# Patient Record
Sex: FEMALE | Race: BLACK OR AFRICAN AMERICAN | NOT HISPANIC OR LATINO | Employment: OTHER | URBAN - METROPOLITAN AREA
[De-identification: names, ages, dates, MRNs, and addresses within clinical notes are randomized per-mention and may not be internally consistent; named-entity substitution may affect disease eponyms.]

---

## 2017-02-06 ENCOUNTER — HISTORICAL (OUTPATIENT)
Dept: ADMINISTRATIVE | Facility: HOSPITAL | Age: 67
End: 2017-02-06

## 2017-05-19 ENCOUNTER — HISTORICAL (OUTPATIENT)
Dept: LAB | Facility: HOSPITAL | Age: 67
End: 2017-05-19

## 2017-11-13 LAB
INFLUENZA A ANTIGEN, POC: NEGATIVE
INFLUENZA B ANTIGEN, POC: NEGATIVE

## 2017-12-14 ENCOUNTER — HISTORICAL (OUTPATIENT)
Dept: LAB | Facility: HOSPITAL | Age: 67
End: 2017-12-14

## 2017-12-14 LAB
ALBUMIN SERPL-MCNC: 4 GM/DL (ref 3.4–5)
ALP SERPL-CCNC: 68 UNIT/L (ref 38–126)
ALT SERPL-CCNC: 39 UNIT/L (ref 12–78)
AST SERPL-CCNC: 24 UNIT/L (ref 15–37)
BILIRUB SERPL-MCNC: 0.4 MG/DL (ref 0.2–1)
BILIRUBIN DIRECT+TOT PNL SERPL-MCNC: 0.1 MG/DL (ref 0–0.2)
BILIRUBIN DIRECT+TOT PNL SERPL-MCNC: 0.3 MG/DL (ref 0–0.8)
CHOLEST SERPL-MCNC: 203 MG/DL (ref 0–200)
CHOLEST/HDLC SERPL: 4.5 {RATIO} (ref 0–4)
EST. AVERAGE GLUCOSE BLD GHB EST-MCNC: 131 MG/DL
HBA1C MFR BLD: 6.2 % (ref 4.2–6.3)
HDLC SERPL-MCNC: 45 MG/DL (ref 35–60)
LDLC SERPL CALC-MCNC: 114 MG/DL (ref 0–129)
LIVER PROFILE INTERP: NORMAL
PROT SERPL-MCNC: 7.5 GM/DL (ref 6.4–8.2)
TRIGL SERPL-MCNC: 219 MG/DL (ref 30–150)
VLDLC SERPL CALC-MCNC: 44 MG/DL

## 2018-02-27 ENCOUNTER — HISTORICAL (OUTPATIENT)
Dept: ADMINISTRATIVE | Facility: HOSPITAL | Age: 68
End: 2018-02-27

## 2018-05-23 ENCOUNTER — HISTORICAL (OUTPATIENT)
Dept: LAB | Facility: HOSPITAL | Age: 68
End: 2018-05-23

## 2018-05-28 ENCOUNTER — HOSPITAL ENCOUNTER (EMERGENCY)
Facility: HOSPITAL | Age: 68
Discharge: HOME | End: 2018-05-28
Attending: EMERGENCY MEDICINE
Payer: MEDICARE

## 2018-05-28 VITALS
SYSTOLIC BLOOD PRESSURE: 150 MMHG | DIASTOLIC BLOOD PRESSURE: 79 MMHG | WEIGHT: 201 LBS | HEIGHT: 62 IN | OXYGEN SATURATION: 98 % | TEMPERATURE: 98 F | HEART RATE: 105 BPM | RESPIRATION RATE: 19 BRPM | BODY MASS INDEX: 36.99 KG/M2

## 2018-05-28 DIAGNOSIS — J06.9 ACUTE URI: ICD-10-CM

## 2018-05-28 DIAGNOSIS — J20.9 ACUTE BRONCHITIS, UNSPECIFIED ORGANISM: Primary | ICD-10-CM

## 2018-05-28 DIAGNOSIS — R05.9 COUGH: ICD-10-CM

## 2018-05-28 PROCEDURE — 99284 EMERGENCY DEPT VISIT MOD MDM: CPT

## 2018-05-28 PROCEDURE — 99900035 HC TECH TIME PER 15 MIN (STAT)

## 2018-05-28 PROCEDURE — 93005 ELECTROCARDIOGRAM TRACING: CPT

## 2018-05-28 RX ORDER — ALBUTEROL SULFATE 90 UG/1
1-2 AEROSOL, METERED RESPIRATORY (INHALATION) EVERY 6 HOURS PRN
Qty: 1 INHALER | Refills: 0 | Status: SHIPPED | OUTPATIENT
Start: 2018-05-28 | End: 2023-08-30

## 2018-05-28 RX ORDER — PREDNISONE 10 MG/1
10 TABLET ORAL DAILY
Qty: 21 TABLET | Refills: 0 | Status: SHIPPED | OUTPATIENT
Start: 2018-05-28 | End: 2018-06-07

## 2018-05-28 RX ORDER — LEVOCETIRIZINE DIHYDROCHLORIDE 5 MG/1
5 TABLET, FILM COATED ORAL NIGHTLY
Qty: 20 TABLET | Refills: 0 | Status: SHIPPED | OUTPATIENT
Start: 2018-05-28 | End: 2023-07-19

## 2018-05-28 RX ORDER — LOSARTAN POTASSIUM AND HYDROCHLOROTHIAZIDE 12.5; 5 MG/1; MG/1
1 TABLET ORAL DAILY
COMMUNITY
End: 2023-01-09

## 2018-05-28 RX ORDER — BENZONATATE 100 MG/1
100 CAPSULE ORAL 3 TIMES DAILY PRN
Qty: 20 CAPSULE | Refills: 0 | Status: SHIPPED | OUTPATIENT
Start: 2018-05-28 | End: 2018-06-07

## 2018-05-28 RX ORDER — MELOXICAM 15 MG/1
15 TABLET ORAL DAILY
COMMUNITY
End: 2022-05-26

## 2018-05-28 NOTE — ED PROVIDER NOTES
Encounter Date: 5/28/2018       History     Chief Complaint   Patient presents with    URI     cough , congestion and sinus pressure since thurs     The history is provided by the patient.   Cough   This is a new problem. Illness onset: 3-4 days ago. The problem has been waxing and waning. The cough is non-productive. There has been no fever. Pertinent negatives include no chest pain, no chills, no sweats, no ear congestion, no headaches, no rhinorrhea, no sore throat, no myalgias, no shortness of breath, no wheezing and no eye redness. Treatments tried: mucinex DM. The treatment provided mild relief. She is not a smoker. Her past medical history does not include bronchitis, pneumonia, bronchiectasis, COPD, emphysema or asthma.     Review of patient's allergies indicates:   Allergen Reactions    Codeine Nausea Only     Past Medical History:   Diagnosis Date    Arthritis     Hypertension      Past Surgical History:   Procedure Laterality Date    COLON SURGERY       Family History   Problem Relation Age of Onset    Diabetes Mother     Cancer Father      Social History   Substance Use Topics    Smoking status: Never Smoker    Smokeless tobacco: Never Used    Alcohol use No     Review of Systems   Constitutional: Negative for chills and fever.   HENT: Positive for congestion and postnasal drip. Negative for rhinorrhea and sore throat.    Eyes: Negative for redness.   Respiratory: Positive for cough. Negative for shortness of breath and wheezing.    Cardiovascular: Negative for chest pain and leg swelling.   Gastrointestinal: Negative for nausea and vomiting.   Genitourinary: Negative for dysuria.   Musculoskeletal: Negative for back pain and myalgias.   Skin: Negative for rash.   Neurological: Negative for weakness and headaches.   Hematological: Does not bruise/bleed easily.   All other systems reviewed and are negative.      Physical Exam     Initial Vitals [05/28/18 0824]   BP Pulse Resp Temp SpO2   (!)  170/82 100 20 98.2 °F (36.8 °C) 99 %      MAP       111.33         Physical Exam    Nursing note and vitals reviewed.  Constitutional: She appears well-developed and well-nourished. No distress.   HENT:   Head: Normocephalic and atraumatic.   Eyes: EOM are normal. Pupils are equal, round, and reactive to light.   Neck: Normal range of motion. Neck supple.   Cardiovascular: Regular rhythm, normal heart sounds and intact distal pulses. Tachycardia present.    pulse @ 100 on exam reg. c occ. Ectopic beat   Pulmonary/Chest: Breath sounds normal. No stridor. She has no wheezes. She has no rhonchi.   Abdominal: Soft. Bowel sounds are normal. She exhibits no mass. There is no rebound and no guarding.   Musculoskeletal: Normal range of motion. She exhibits no edema.   Neurological: She is alert and oriented to person, place, and time. She has normal strength. No sensory deficit.   Skin: Skin is warm and dry. Capillary refill takes less than 2 seconds. No rash noted.   Psychiatric: She has a normal mood and affect. Her behavior is normal. Judgment and thought content normal.         ED Course   Procedures  Labs Reviewed - No data to display  EKG Readings: (Independently Interpreted)   Initial Reading: No STEMI. Rhythm: Sinus Tachycardia. Heart Rate: 108. Ectopy: PACs Less than or equal to 6/min. Other Impression: Sinus tachycardia at 108 with occasional premature supraventricular complexes.  Nonspecific ST T wave abnormalities.  No STEMI              Imaging Results          X-Ray Chest PA And Lateral (Final result)  Result time 05/28/18 09:20:21    Final result by Tomas Bush MD (05/28/18 09:20:21)                 Impression:      No acute process      Electronically signed by: Tomas Bush MD  Date:    05/28/2018  Time:    09:20             Narrative:    EXAMINATION:  XR CHEST PA AND LATERAL    CLINICAL HISTORY:  Cough    FINDINGS:  No prior study.  Normal size heart.  No congestion.  Lungs are clear.  Thoracic  spondylosis.                                          patient does state that she has had it occasional palpitations recently.  She will follow up with her primary care doctor and discuss possible outpatient Holter monitor.  She has not complained of any chest pain is very comfortable with symptomatic outpatient treatment at this time.  She will return emergency department for any worsening signs or symptoms.        Clinical Impression:   The primary encounter diagnosis was Acute bronchitis, unspecified organism. Diagnoses of Cough and Acute URI were also pertinent to this visit.    Disposition:   Disposition: Discharged  Condition: Stable                        Jose Elias Finley MD  05/28/18 0931

## 2018-05-28 NOTE — ED NOTES
aaox3 , skin warm and dry, resp unlabored and even, cough and congestion since thurs. ,amb with steady gait and portillo well.

## 2018-05-30 ENCOUNTER — HISTORICAL (OUTPATIENT)
Dept: LAB | Facility: HOSPITAL | Age: 68
End: 2018-05-30

## 2018-06-06 ENCOUNTER — HISTORICAL (OUTPATIENT)
Dept: LAB | Facility: HOSPITAL | Age: 68
End: 2018-06-06

## 2018-10-16 ENCOUNTER — HOSPITAL ENCOUNTER (EMERGENCY)
Facility: HOSPITAL | Age: 68
Discharge: HOME | End: 2018-10-16
Attending: EMERGENCY MEDICINE
Payer: MEDICARE

## 2018-10-16 VITALS
RESPIRATION RATE: 22 BRPM | DIASTOLIC BLOOD PRESSURE: 77 MMHG | TEMPERATURE: 99 F | SYSTOLIC BLOOD PRESSURE: 149 MMHG | OXYGEN SATURATION: 97 % | WEIGHT: 195.56 LBS | HEART RATE: 99 BPM | BODY MASS INDEX: 35.77 KG/M2

## 2018-10-16 DIAGNOSIS — R10.11 RIGHT UPPER QUADRANT ABDOMINAL PAIN: Primary | ICD-10-CM

## 2018-10-16 DIAGNOSIS — K59.00 CONSTIPATION, UNSPECIFIED CONSTIPATION TYPE: ICD-10-CM

## 2018-10-16 DIAGNOSIS — I10 ESSENTIAL HYPERTENSION: ICD-10-CM

## 2018-10-16 LAB
ALBUMIN SERPL BCP-MCNC: 4 G/DL
ALP SERPL-CCNC: 58 U/L
ALT SERPL W/O P-5'-P-CCNC: 26 U/L
ANION GAP SERPL CALC-SCNC: 10 MMOL/L
AST SERPL-CCNC: 24 U/L
BACTERIA #/AREA URNS AUTO: ABNORMAL /HPF
BASOPHILS # BLD AUTO: 0.03 K/UL
BASOPHILS NFR BLD: 0.2 %
BILIRUB SERPL-MCNC: 0.9 MG/DL
BILIRUB UR QL STRIP: NEGATIVE
BUN SERPL-MCNC: 13 MG/DL
CALCIUM SERPL-MCNC: 9.7 MG/DL
CHLORIDE SERPL-SCNC: 104 MMOL/L
CLARITY UR REFRACT.AUTO: ABNORMAL
CO2 SERPL-SCNC: 27 MMOL/L
COLOR UR AUTO: YELLOW
CREAT SERPL-MCNC: 0.8 MG/DL
DIFFERENTIAL METHOD: ABNORMAL
EOSINOPHIL # BLD AUTO: 0.1 K/UL
EOSINOPHIL NFR BLD: 0.4 %
ERYTHROCYTE [DISTWIDTH] IN BLOOD BY AUTOMATED COUNT: 14.8 %
EST. GFR  (AFRICAN AMERICAN): >60 ML/MIN/1.73 M^2
EST. GFR  (NON AFRICAN AMERICAN): >60 ML/MIN/1.73 M^2
GLUCOSE SERPL-MCNC: 103 MG/DL
GLUCOSE UR QL STRIP: NEGATIVE
HCT VFR BLD AUTO: 33.2 %
HGB BLD-MCNC: 11.8 G/DL
HGB UR QL STRIP: ABNORMAL
KETONES UR QL STRIP: NEGATIVE
LEUKOCYTE ESTERASE UR QL STRIP: ABNORMAL
LIPASE SERPL-CCNC: 12 U/L
LYMPHOCYTES # BLD AUTO: 2.3 K/UL
LYMPHOCYTES NFR BLD: 14.3 %
MCH RBC QN AUTO: 28 PG
MCHC RBC AUTO-ENTMCNC: 35.5 G/DL
MCV RBC AUTO: 79 FL
MICROSCOPIC COMMENT: ABNORMAL
MONOCYTES # BLD AUTO: 1.7 K/UL
MONOCYTES NFR BLD: 10.6 %
NEUTROPHILS # BLD AUTO: 11.7 K/UL
NEUTROPHILS NFR BLD: 74.2 %
NITRITE UR QL STRIP: POSITIVE
PH UR STRIP: 8 [PH] (ref 5–8)
PLATELET # BLD AUTO: 332 K/UL
PMV BLD AUTO: 10.9 FL
POTASSIUM SERPL-SCNC: 3.9 MMOL/L
PROT SERPL-MCNC: 7.9 G/DL
PROT UR QL STRIP: NEGATIVE
RBC # BLD AUTO: 4.21 M/UL
RBC #/AREA URNS AUTO: 3 /HPF (ref 0–4)
SODIUM SERPL-SCNC: 141 MMOL/L
SP GR UR STRIP: 1.01 (ref 1–1.03)
SQUAMOUS #/AREA URNS AUTO: 2 /HPF
TROPONIN I SERPL DL<=0.01 NG/ML-MCNC: <0.006 NG/ML
URN SPEC COLLECT METH UR: ABNORMAL
UROBILINOGEN UR STRIP-ACNC: NEGATIVE EU/DL
WBC # BLD AUTO: 15.8 K/UL
WBC #/AREA URNS AUTO: 6 /HPF (ref 0–5)

## 2018-10-16 PROCEDURE — 84484 ASSAY OF TROPONIN QUANT: CPT

## 2018-10-16 PROCEDURE — 93005 ELECTROCARDIOGRAM TRACING: CPT

## 2018-10-16 PROCEDURE — 81000 URINALYSIS NONAUTO W/SCOPE: CPT

## 2018-10-16 PROCEDURE — 99285 EMERGENCY DEPT VISIT HI MDM: CPT | Mod: 25

## 2018-10-16 PROCEDURE — 99900035 HC TECH TIME PER 15 MIN (STAT)

## 2018-10-16 PROCEDURE — 93010 ELECTROCARDIOGRAM REPORT: CPT | Mod: ,,, | Performed by: INTERNAL MEDICINE

## 2018-10-16 PROCEDURE — 85025 COMPLETE CBC W/AUTO DIFF WBC: CPT

## 2018-10-16 PROCEDURE — 80053 COMPREHEN METABOLIC PANEL: CPT

## 2018-10-16 PROCEDURE — 83690 ASSAY OF LIPASE: CPT

## 2018-10-16 RX ORDER — POLYETHYLENE GLYCOL 3350 17 G/17G
17 POWDER, FOR SOLUTION ORAL DAILY
Qty: 24 PACKET | Refills: 0 | Status: SHIPPED | OUTPATIENT
Start: 2018-10-16 | End: 2023-08-30

## 2018-10-16 RX ORDER — SYRING-NEEDL,DISP,INSUL,0.3 ML 29 G X1/2"
296 SYRINGE, EMPTY DISPOSABLE MISCELLANEOUS ONCE
Qty: 300 ML | Refills: 0 | Status: SHIPPED | OUTPATIENT
Start: 2018-10-16 | End: 2018-10-16

## 2018-10-16 NOTE — ED PROVIDER NOTES
Encounter Date: 10/16/2018       History     Chief Complaint   Patient presents with    Abdominal Pain     RUQ pain onset Sunday, took Mag Citrate without relief, unsure if constipation     The history is provided by the patient.   Abdominal Pain   The current episode started two days ago. The onset of the illness was gradual. The problem has been gradually worsening. The abdominal pain is located in the RUQ. The abdominal pain does not radiate. The severity of the abdominal pain is 8/10. Relieved by: warm towel. The abdominal pain is exacerbated by certain positions. The other symptoms of the illness include diarrhea. The other symptoms of the illness do not include fever, jaundice, melena, shortness of breath, nausea, vomiting, dysuria, hematemesis or hematochezia.   The diarrhea began yesterday. The diarrhea is watery.   The illness is associated with laxative use (took mag citrate b/c she thought she might be constipated.). The patient states that she believes she is currently not pregnant. The patient has had a change in bowel habit. Additional symptoms associated with the illness include constipation. Symptoms associated with the illness do not include chills, anorexia, diaphoresis, heartburn, urgency, hematuria, frequency or back pain. Significant associated medical issues include diverticulitis. Significant associated medical issues do not include PUD, inflammatory bowel disease, diabetes, gallstones, liver disease or substance abuse.     Pt visiting from [facility] PCP here so she came to ED for eval.      Review of patient's allergies indicates:   Allergen Reactions    Codeine Nausea Only     Past Medical History:   Diagnosis Date    Arthritis     Hypertension      Past Surgical History:   Procedure Laterality Date    COLON SURGERY       Family History   Problem Relation Age of Onset    Diabetes Mother     Cancer Father      Social History     Tobacco Use    Smoking status: Never Smoker     Smokeless tobacco: Never Used   Substance Use Topics    Alcohol use: No    Drug use: No     Review of Systems   Constitutional: Negative for chills, diaphoresis and fever.   HENT: Negative for sore throat.    Respiratory: Negative for shortness of breath.    Cardiovascular: Negative for chest pain.   Gastrointestinal: Positive for abdominal pain, constipation and diarrhea. Negative for anorexia, blood in stool, heartburn, hematemesis, hematochezia, jaundice, melena, nausea and vomiting.   Genitourinary: Negative for dysuria, frequency, hematuria and urgency.   Musculoskeletal: Negative for back pain.   Skin: Negative for rash.   Neurological: Negative for weakness.   Hematological: Does not bruise/bleed easily.   All other systems reviewed and are negative.      Physical Exam     Initial Vitals [10/16/18 0836]   BP Pulse Resp Temp SpO2   (!) 171/81 108 16 98.9 °F (37.2 °C) 98 %      MAP       --         Vitals:    10/16/18 0836 10/16/18 0900 10/16/18 0926 10/16/18 1002   BP: (!) 171/81  (!) 143/76 (!) 149/77   Pulse: 108 106 102 99   Resp: 16  (!) 22    Temp: 98.9 °F (37.2 °C)      TempSrc: Oral      SpO2: 98%  97% 97%   Weight: 88.7 kg (195 lb 8.8 oz)        Physical Exam    Nursing note and vitals reviewed.  Constitutional: She appears well-developed and well-nourished. No distress.   HENT:   Head: Normocephalic and atraumatic.   Eyes: EOM are normal. Pupils are equal, round, and reactive to light.   Neck: Normal range of motion. Neck supple.   Cardiovascular: Normal rate, regular rhythm, normal heart sounds and intact distal pulses.   Pulmonary/Chest: Breath sounds normal. No stridor. She has no wheezes. She has no rhonchi.   Abdominal: Soft. Bowel sounds are normal. She exhibits no distension, no abdominal bruit, no ascites and no mass. There is tenderness in the right upper quadrant. There is positive Ray's sign. There is no rebound, no guarding and no tenderness at McBurney's point.   Musculoskeletal:  Normal range of motion. She exhibits no edema.   Neurological: She is alert and oriented to person, place, and time. She has normal strength. No sensory deficit.   Skin: Skin is warm and dry. Capillary refill takes less than 2 seconds. No rash noted.   Psychiatric: She has a normal mood and affect. Her behavior is normal. Judgment and thought content normal.         ED Course   Procedures  Labs Reviewed   CBC W/ AUTO DIFFERENTIAL - Abnormal; Notable for the following components:       Result Value    WBC 15.80 (*)     Hemoglobin 11.8 (*)     Hematocrit 33.2 (*)     MCV 79 (*)     RDW 14.8 (*)     Gran # (ANC) 11.7 (*)     Mono # 1.7 (*)     Gran% 74.2 (*)     Lymph% 14.3 (*)     All other components within normal limits   URINALYSIS, REFLEX TO URINE CULTURE - Abnormal; Notable for the following components:    Appearance, UA Hazy (*)     Occult Blood UA 1+ (*)     Nitrite, UA Positive (*)     Leukocytes, UA Trace (*)     All other components within normal limits    Narrative:     Preferred Collection Type->Urine, Clean Catch   URINALYSIS MICROSCOPIC - Abnormal; Notable for the following components:    WBC, UA 6 (*)     Bacteria, UA Moderate (*)     All other components within normal limits    Narrative:     Preferred Collection Type->Urine, Clean Catch   COMPREHENSIVE METABOLIC PANEL   LIPASE   TROPONIN I     Results for orders placed or performed during the hospital encounter of 10/16/18   CBC W/ AUTO DIFFERENTIAL   Result Value Ref Range    WBC 15.80 (H) 3.90 - 12.70 K/uL    RBC 4.21 4.00 - 5.40 M/uL    Hemoglobin 11.8 (L) 12.0 - 16.0 g/dL    Hematocrit 33.2 (L) 37.0 - 48.5 %    MCV 79 (L) 82 - 98 fL    MCH 28.0 27.0 - 31.0 pg    MCHC 35.5 32.0 - 36.0 g/dL    RDW 14.8 (H) 11.5 - 14.5 %    Platelets 332 150 - 350 K/uL    MPV 10.9 9.2 - 12.9 fL    Gran # (ANC) 11.7 (H) 1.8 - 7.7 K/uL    Lymph # 2.3 1.0 - 4.8 K/uL    Mono # 1.7 (H) 0.3 - 1.0 K/uL    Eos # 0.1 0.0 - 0.5 K/uL    Baso # 0.03 0.00 - 0.20 K/uL    Gran%  74.2 (H) 38.0 - 73.0 %    Lymph% 14.3 (L) 18.0 - 48.0 %    Mono% 10.6 4.0 - 15.0 %    Eosinophil% 0.4 0.0 - 8.0 %    Basophil% 0.2 0.0 - 1.9 %    Differential Method Automated    Comp. Metabolic Panel   Result Value Ref Range    Sodium 141 136 - 145 mmol/L    Potassium 3.9 3.5 - 5.1 mmol/L    Chloride 104 95 - 110 mmol/L    CO2 27 23 - 29 mmol/L    Glucose 103 70 - 110 mg/dL    BUN, Bld 13 8 - 23 mg/dL    Creatinine 0.8 0.5 - 1.4 mg/dL    Calcium 9.7 8.7 - 10.5 mg/dL    Total Protein 7.9 6.0 - 8.4 g/dL    Albumin 4.0 3.5 - 5.2 g/dL    Total Bilirubin 0.9 0.1 - 1.0 mg/dL    Alkaline Phosphatase 58 55 - 135 U/L    AST 24 10 - 40 U/L    ALT 26 10 - 44 U/L    Anion Gap 10 8 - 16 mmol/L    eGFR if African American >60.0 >60 mL/min/1.73 m^2    eGFR if non African American >60.0 >60 mL/min/1.73 m^2   Lipase   Result Value Ref Range    Lipase 12 4 - 60 U/L   Urinalysis, Reflex to Urine Culture Urine, Clean Catch   Result Value Ref Range    Specimen UA Urine, Clean Catch     Color, UA Yellow Yellow, Straw, Jessie    Appearance, UA Hazy (A) Clear    pH, UA 8.0 5.0 - 8.0    Specific Gravity, UA 1.010 1.005 - 1.030    Protein, UA Negative Negative    Glucose, UA Negative Negative    Ketones, UA Negative Negative    Bilirubin (UA) Negative Negative    Occult Blood UA 1+ (A) Negative    Nitrite, UA Positive (A) Negative    Urobilinogen, UA Negative <2.0 EU/dL    Leukocytes, UA Trace (A) Negative   Troponin I   Result Value Ref Range    Troponin I <0.006 0.000 - 0.026 ng/mL   Urinalysis Microscopic   Result Value Ref Range    RBC, UA 3 0 - 4 /hpf    WBC, UA 6 (H) 0 - 5 /hpf    Bacteria, UA Moderate (A) None-Occ /hpf    Squam Epithel, UA 2 /hpf    Microscopic Comment SEE COMMENT       EKG Readings: (Independently Interpreted)   Initial Reading: No STEMI. Rhythm: Sinus Tachycardia. Heart Rate: 110. Ectopy: No Ectopy. Other Impression: Sinus tachycardia at 110 with nonspecific ST T wave abnormalities.  No STEMI       Imaging Results           US Abdomen Limited (Final result)  Result time 10/16/18 09:37:53    Final result by MEMO Napier Sr., MD (10/16/18 09:37:53)                 Impression:      1. Hepatomegaly most likely secondary to hepatic steatosis.  The liver measures 18.6 cm in length.  2. The gallbladder is contracted.  3. There is an 8 mm cortically based cyst in the superior pole of the right kidney. There is no hydronephrosis or nephrolithiasis.      Electronically signed by: Andriy Napier MD  Date:    10/16/2018  Time:    09:37             Narrative:    EXAMINATION:  US ABDOMEN LIMITED    CLINICAL HISTORY:  RUQ pain; the patient eat a boiled egg this morning    TECHNIQUE:  Multiple static ultrasound images are submitted for interpretation.    FINDINGS:  The liver is enlarged.  It measures 18.6 cm in length.  The liver has mildly increased echogenicity and coarsening of its echotexture.  There is no intrahepatic biliary ductal dilatation. The common bile duct has a diameter of 5 mm. The gallbladder is contracted.  The visualized portion of the pancreas is normal in appearance. The right kidney measures 12.1 cm in length.  There is an 8 mm cortically based cyst in the superior pole of the right kidney.  There is no hydronephrosis or nephrolithiasis.  The main portal vein has a normal venous waveform with flow directed towards the liver. It has a velocity of 15 cm/sec.                               X-Ray Abdomen Flat And Erect (Final result)  Result time 10/16/18 09:14:20    Final result by MEMO Napier Sr., MD (10/16/18 09:14:20)                 Impression:      1. The superior aspect of the lumbar spine is slightly tilted towards the right.  This may be positional.  2. The bowel gas pattern is normal in appearance.      Electronically signed by: Andriy Napier MD  Date:    10/16/2018  Time:    09:14             Narrative:    EXAMINATION:  XR ABDOMEN FLAT AND ERECT    CLINICAL HISTORY:  Abdominal  Pain;    COMPARISON:  None    FINDINGS:  The bowel gas pattern is normal in appearance. There is no pneumoperitoneum.  The superior aspect of the lumbar spine is slightly tilted towards the right.                                  Imaging Results          US Abdomen Limited (Final result)  Result time 10/16/18 09:37:53    Final result by MEMO Napier Sr., MD (10/16/18 09:37:53)                 Impression:      1. Hepatomegaly most likely secondary to hepatic steatosis.  The liver measures 18.6 cm in length.  2. The gallbladder is contracted.  3. There is an 8 mm cortically based cyst in the superior pole of the right kidney. There is no hydronephrosis or nephrolithiasis.      Electronically signed by: Andriy Napier MD  Date:    10/16/2018  Time:    09:37             Narrative:    EXAMINATION:  US ABDOMEN LIMITED    CLINICAL HISTORY:  RUQ pain; the patient eat a boiled egg this morning    TECHNIQUE:  Multiple static ultrasound images are submitted for interpretation.    FINDINGS:  The liver is enlarged.  It measures 18.6 cm in length.  The liver has mildly increased echogenicity and coarsening of its echotexture.  There is no intrahepatic biliary ductal dilatation. The common bile duct has a diameter of 5 mm. The gallbladder is contracted.  The visualized portion of the pancreas is normal in appearance. The right kidney measures 12.1 cm in length.  There is an 8 mm cortically based cyst in the superior pole of the right kidney.  There is no hydronephrosis or nephrolithiasis.  The main portal vein has a normal venous waveform with flow directed towards the liver. It has a velocity of 15 cm/sec.                               X-Ray Abdomen Flat And Erect (Final result)  Result time 10/16/18 09:14:20    Final result by MEMO Napier Sr., MD (10/16/18 09:14:20)                 Impression:      1. The superior aspect of the lumbar spine is slightly tilted towards the right.  This may be positional.  2. The bowel  gas pattern is normal in appearance.      Electronically signed by: Andriy Napier MD  Date:    10/16/2018  Time:    09:14             Narrative:    EXAMINATION:  XR ABDOMEN FLAT AND ERECT    CLINICAL HISTORY:  Abdominal Pain;    COMPARISON:  None    FINDINGS:  The bowel gas pattern is normal in appearance. There is no pneumoperitoneum.  The superior aspect of the lumbar spine is slightly tilted towards the right.                                      patient states she thought her discomfort was due to constipation and took a partial bottle of magnesium citrate he did have some liquid stool.  I discussed her workup does not seem consistent with a severe infections/gallbladder pathology/etc at this time and she still has significant amount of stool on the x-ray in the area of her discomfort.  We discussed starting MiraLax and repeating the magnesium citrate to see if we can relieve her symptoms.  She will follow up with her primary care doctor in the next 12-24 hours for recheck if her symptoms persist and return to the emergency department immediately for any worsening signs or symptoms.    I discussed with patient and/or family/caretaker that evaluation in the ED does not suggest any emergent or life threatening medical conditions requiring immediate intervention beyond what was provided in the ED, and I believe patient is safe for discharge.  Regardless, an unremarkable evaluation in the ED does not preclude the development or presence of a serious of life threatening condition. As such, patient was instructed to return immediately for any worsening or change in current symptoms.              Clinical Impression:   The primary encounter diagnosis was Right upper quadrant abdominal pain. Diagnoses of Constipation, unspecified constipation type and Essential hypertension were also pertinent to this visit.      Disposition:   Disposition: Discharged  Condition: Stable                        Jose Elias Finley MD  10/16/18  [id number]

## 2018-11-09 ENCOUNTER — HISTORICAL (OUTPATIENT)
Dept: ADMINISTRATIVE | Facility: HOSPITAL | Age: 68
End: 2018-11-09

## 2018-11-09 LAB
ABS NEUT (OLG): 3.26 X10(3)/MCL (ref 2.1–9.2)
ALBUMIN SERPL-MCNC: 4 GM/DL (ref 3.4–5)
ALBUMIN/GLOB SERPL: 1.1 RATIO (ref 1.1–2)
ALP SERPL-CCNC: 65 UNIT/L (ref 38–126)
ALT SERPL-CCNC: 29 UNIT/L (ref 12–78)
APPEARANCE, UA: ABNORMAL
AST SERPL-CCNC: 14 UNIT/L (ref 15–37)
BACTERIA SPEC CULT: ABNORMAL /HPF
BASOPHILS # BLD AUTO: 0 X10(3)/MCL (ref 0–0.2)
BASOPHILS NFR BLD AUTO: 0 %
BILIRUB SERPL-MCNC: 0.6 MG/DL (ref 0.2–1)
BILIRUB UR QL STRIP: NEGATIVE
BILIRUBIN DIRECT+TOT PNL SERPL-MCNC: 0.1 MG/DL (ref 0–0.5)
BILIRUBIN DIRECT+TOT PNL SERPL-MCNC: 0.5 MG/DL (ref 0–0.8)
BUN SERPL-MCNC: 15 MG/DL (ref 7–18)
CALCIUM SERPL-MCNC: 9.1 MG/DL (ref 8.5–10.1)
CHLORIDE SERPL-SCNC: 106 MMOL/L (ref 98–107)
CHOLEST SERPL-MCNC: 211 MG/DL (ref 0–200)
CHOLEST/HDLC SERPL: 4.4 {RATIO} (ref 0–4)
CO2 SERPL-SCNC: 28 MMOL/L (ref 21–32)
COLOR UR: YELLOW
CREAT SERPL-MCNC: 0.8 MG/DL (ref 0.55–1.02)
DEPRECATED CALCIDIOL+CALCIFEROL SERPL-MC: 53.93 NG/ML (ref 30–80)
EOSINOPHIL # BLD AUTO: 0.2 X10(3)/MCL (ref 0–0.9)
EOSINOPHIL NFR BLD AUTO: 3 %
ERYTHROCYTE [DISTWIDTH] IN BLOOD BY AUTOMATED COUNT: 15.1 % (ref 11.5–17)
GLOBULIN SER-MCNC: 3.5 GM/DL (ref 2.4–3.5)
GLUCOSE (UA): NEGATIVE
GLUCOSE SERPL-MCNC: 91 MG/DL (ref 74–106)
HCT VFR BLD AUTO: 35.7 % (ref 37–47)
HDLC SERPL-MCNC: 48 MG/DL (ref 35–60)
HGB BLD-MCNC: 12 GM/DL (ref 12–16)
HGB UR QL STRIP: ABNORMAL
KETONES UR QL STRIP: NEGATIVE
LDLC SERPL CALC-MCNC: 126 MG/DL (ref 0–129)
LEUKOCYTE ESTERASE UR QL STRIP: ABNORMAL
LYMPHOCYTES # BLD AUTO: 2.2 X10(3)/MCL (ref 0.6–4.6)
LYMPHOCYTES NFR BLD AUTO: 34 %
MCH RBC QN AUTO: 27.3 PG (ref 27–31)
MCHC RBC AUTO-ENTMCNC: 33.6 GM/DL (ref 33–36)
MCV RBC AUTO: 81.1 FL (ref 80–94)
MONOCYTES # BLD AUTO: 0.7 X10(3)/MCL (ref 0.1–1.3)
MONOCYTES NFR BLD AUTO: 11 %
NEUTROPHILS # BLD AUTO: 3.26 X10(3)/MCL (ref 2.1–9.2)
NEUTROPHILS NFR BLD AUTO: 50 %
NITRITE UR QL STRIP: POSITIVE
PH UR STRIP: 6 [PH] (ref 5–9)
PLATELET # BLD AUTO: 281 X10(3)/MCL (ref 130–400)
PMV BLD AUTO: 10.5 FL (ref 9.4–12.4)
POTASSIUM SERPL-SCNC: 4 MMOL/L (ref 3.5–5.1)
PROT SERPL-MCNC: 7.5 GM/DL (ref 6.4–8.2)
PROT UR QL STRIP: NEGATIVE
RBC # BLD AUTO: 4.4 X10(6)/MCL (ref 4.2–5.4)
RBC #/AREA URNS HPF: ABNORMAL /[HPF]
SODIUM SERPL-SCNC: 141 MMOL/L (ref 136–145)
SP GR UR STRIP: 1.01 (ref 1–1.03)
SQUAMOUS EPITHELIAL, UA: 20 /HPF (ref 0–4)
TRIGL SERPL-MCNC: 186 MG/DL (ref 30–150)
TSH SERPL-ACNC: 1.1 MIU/L (ref 0.36–3.74)
UROBILINOGEN UR STRIP-ACNC: 0.2
VLDLC SERPL CALC-MCNC: 37 MG/DL
WBC # SPEC AUTO: 6.4 X10(3)/MCL (ref 4.5–11.5)
WBC #/AREA URNS HPF: 7 /HPF (ref 0–3)

## 2018-11-13 ENCOUNTER — HISTORICAL (OUTPATIENT)
Dept: LAB | Facility: HOSPITAL | Age: 68
End: 2018-11-13

## 2018-11-13 LAB
APPEARANCE, UA: ABNORMAL
BACTERIA SPEC CULT: ABNORMAL /HPF
BILIRUB UR QL STRIP: NEGATIVE
COLOR UR: YELLOW
GLUCOSE (UA): NEGATIVE
HGB UR QL STRIP: ABNORMAL
KETONES UR QL STRIP: NEGATIVE
LEUKOCYTE ESTERASE UR QL STRIP: NEGATIVE
NITRITE UR QL STRIP: NEGATIVE
PH UR STRIP: 6 [PH] (ref 5–9)
PROT UR QL STRIP: NEGATIVE
RBC #/AREA URNS HPF: ABNORMAL /[HPF]
SP GR UR STRIP: 1.01 (ref 1–1.03)
SQUAMOUS EPITHELIAL, UA: 16 /HPF (ref 0–4)
UROBILINOGEN UR STRIP-ACNC: 0.2
WBC #/AREA URNS HPF: 6 /HPF (ref 0–3)

## 2018-12-04 ENCOUNTER — HISTORICAL (OUTPATIENT)
Dept: LAB | Facility: HOSPITAL | Age: 68
End: 2018-12-04

## 2018-12-04 LAB
APPEARANCE, UA: ABNORMAL
BACTERIA SPEC CULT: ABNORMAL /HPF
BILIRUB UR QL STRIP: NEGATIVE
COLOR UR: YELLOW
GLUCOSE (UA): NEGATIVE
HGB UR QL STRIP: ABNORMAL
KETONES UR QL STRIP: NEGATIVE
LEUKOCYTE ESTERASE UR QL STRIP: ABNORMAL
NITRITE UR QL STRIP: POSITIVE
PH UR STRIP: 6.5 [PH] (ref 5–9)
PROT UR QL STRIP: NEGATIVE
RBC #/AREA URNS HPF: ABNORMAL /[HPF]
SP GR UR STRIP: 1.02 (ref 1–1.03)
SQUAMOUS EPITHELIAL, UA: 13 /HPF (ref 0–4)
UROBILINOGEN UR STRIP-ACNC: 0.2
WBC #/AREA URNS HPF: 7 /HPF (ref 0–3)

## 2019-05-28 ENCOUNTER — HISTORICAL (OUTPATIENT)
Dept: ADMINISTRATIVE | Facility: HOSPITAL | Age: 69
End: 2019-05-28

## 2019-05-28 LAB
ABS NEUT (OLG): 3.35 X10(3)/MCL (ref 2.1–9.2)
ALBUMIN SERPL-MCNC: 4 GM/DL (ref 3.4–5)
ALBUMIN/GLOB SERPL: 1.2 RATIO (ref 1.1–2)
ALP SERPL-CCNC: 64 UNIT/L (ref 38–126)
ALT SERPL-CCNC: 36 UNIT/L (ref 12–78)
AST SERPL-CCNC: 23 UNIT/L (ref 15–37)
BASOPHILS # BLD AUTO: 0.1 X10(3)/MCL (ref 0–0.2)
BASOPHILS NFR BLD AUTO: 1 %
BILIRUB SERPL-MCNC: 0.6 MG/DL (ref 0.2–1)
BILIRUBIN DIRECT+TOT PNL SERPL-MCNC: 0.1 MG/DL (ref 0–0.5)
BILIRUBIN DIRECT+TOT PNL SERPL-MCNC: 0.5 MG/DL (ref 0–0.8)
BUN SERPL-MCNC: 14 MG/DL (ref 7–18)
CALCIUM SERPL-MCNC: 9.4 MG/DL (ref 8.5–10.1)
CHLORIDE SERPL-SCNC: 110 MMOL/L (ref 98–107)
CO2 SERPL-SCNC: 28 MMOL/L (ref 21–32)
CREAT SERPL-MCNC: 0.75 MG/DL (ref 0.55–1.02)
DEPRECATED CALCIDIOL+CALCIFEROL SERPL-MC: 47.01 NG/ML (ref 30–80)
EOSINOPHIL # BLD AUTO: 0.1 X10(3)/MCL (ref 0–0.9)
EOSINOPHIL NFR BLD AUTO: 2 %
ERYTHROCYTE [DISTWIDTH] IN BLOOD BY AUTOMATED COUNT: 14.7 % (ref 11.5–17)
ERYTHROCYTE [SEDIMENTATION RATE] IN BLOOD: 5 MM/HR (ref 0–20)
GLOBULIN SER-MCNC: 3.3 GM/DL (ref 2.4–3.5)
GLUCOSE SERPL-MCNC: 110 MG/DL (ref 74–106)
HCT VFR BLD AUTO: 38.2 % (ref 37–47)
HGB BLD-MCNC: 12.5 GM/DL (ref 12–16)
LYMPHOCYTES # BLD AUTO: 2.1 X10(3)/MCL (ref 0.6–4.6)
LYMPHOCYTES NFR BLD AUTO: 32 %
MAGNESIUM SERPL-MCNC: 2.2 MG/DL (ref 1.8–2.4)
MCH RBC QN AUTO: 27.7 PG (ref 27–31)
MCHC RBC AUTO-ENTMCNC: 32.7 GM/DL (ref 33–36)
MCV RBC AUTO: 84.7 FL (ref 80–94)
MONOCYTES # BLD AUTO: 0.8 X10(3)/MCL (ref 0.1–1.3)
MONOCYTES NFR BLD AUTO: 12 %
NEUTROPHILS # BLD AUTO: 3.35 X10(3)/MCL (ref 2.1–9.2)
NEUTROPHILS NFR BLD AUTO: 52 %
PLATELET # BLD AUTO: 346 X10(3)/MCL (ref 130–400)
PMV BLD AUTO: 10.7 FL (ref 9.4–12.4)
POTASSIUM SERPL-SCNC: 4.1 MMOL/L (ref 3.5–5.1)
PROT SERPL-MCNC: 7.3 GM/DL (ref 6.4–8.2)
RBC # BLD AUTO: 4.51 X10(6)/MCL (ref 4.2–5.4)
SODIUM SERPL-SCNC: 142 MMOL/L (ref 136–145)
WBC # SPEC AUTO: 6.4 X10(3)/MCL (ref 4.5–11.5)

## 2019-07-31 ENCOUNTER — HISTORICAL (OUTPATIENT)
Dept: RADIOLOGY | Facility: HOSPITAL | Age: 69
End: 2019-07-31

## 2019-08-08 ENCOUNTER — HISTORICAL (OUTPATIENT)
Dept: RADIOLOGY | Facility: HOSPITAL | Age: 69
End: 2019-08-08

## 2019-08-27 ENCOUNTER — HISTORICAL (OUTPATIENT)
Dept: ADMINISTRATIVE | Facility: HOSPITAL | Age: 69
End: 2019-08-27

## 2019-12-03 ENCOUNTER — HISTORICAL (OUTPATIENT)
Dept: ADMINISTRATIVE | Facility: HOSPITAL | Age: 69
End: 2019-12-03

## 2020-06-02 ENCOUNTER — HISTORICAL (OUTPATIENT)
Dept: ADMINISTRATIVE | Facility: HOSPITAL | Age: 70
End: 2020-06-02

## 2020-06-22 ENCOUNTER — HISTORICAL (OUTPATIENT)
Dept: ADMINISTRATIVE | Facility: HOSPITAL | Age: 70
End: 2020-06-22

## 2020-06-23 ENCOUNTER — HISTORICAL (OUTPATIENT)
Dept: RADIOLOGY | Facility: HOSPITAL | Age: 70
End: 2020-06-23

## 2020-06-23 LAB — BMD RECOMMENDATION EXT: NORMAL

## 2020-07-01 ENCOUNTER — HISTORICAL (OUTPATIENT)
Dept: ADMINISTRATIVE | Facility: HOSPITAL | Age: 70
End: 2020-07-01

## 2020-07-21 LAB — CRC RECOMMENDATION EXT: NORMAL

## 2020-12-11 ENCOUNTER — HISTORICAL (OUTPATIENT)
Dept: ADMINISTRATIVE | Facility: HOSPITAL | Age: 70
End: 2020-12-11

## 2020-12-28 ENCOUNTER — HISTORICAL (OUTPATIENT)
Dept: INFECTIOUS DISEASES | Facility: HOSPITAL | Age: 70
End: 2020-12-28

## 2021-06-11 ENCOUNTER — HISTORICAL (OUTPATIENT)
Dept: ADMINISTRATIVE | Facility: HOSPITAL | Age: 71
End: 2021-06-11

## 2021-06-23 ENCOUNTER — HISTORICAL (OUTPATIENT)
Dept: ADMINISTRATIVE | Facility: HOSPITAL | Age: 71
End: 2021-06-23

## 2021-09-16 ENCOUNTER — HISTORICAL (OUTPATIENT)
Dept: ADMINISTRATIVE | Facility: HOSPITAL | Age: 71
End: 2021-09-16

## 2022-03-14 ENCOUNTER — HISTORICAL (OUTPATIENT)
Dept: ADMINISTRATIVE | Facility: HOSPITAL | Age: 72
End: 2022-03-14

## 2022-03-14 LAB
ABS NEUT (OLG): 3.84 (ref 2.1–9.2)
ALBUMIN SERPL-MCNC: 4.3 G/DL (ref 3.4–4.8)
ALBUMIN/GLOB SERPL: 1.4 {RATIO} (ref 1.1–2)
ALP SERPL-CCNC: 58 U/L (ref 40–150)
ALT SERPL-CCNC: 20 U/L (ref 0–55)
APPEARANCE, UA: NORMAL
AST SERPL-CCNC: 20 U/L (ref 5–34)
BACTERIA SPEC CULT: NORMAL
BASOPHILS # BLD AUTO: 0.1 10*3/UL (ref 0–0.2)
BASOPHILS NFR BLD AUTO: 1 %
BILIRUB SERPL-MCNC: 0.9 MG/DL
BILIRUB UR QL STRIP: NEGATIVE
BILIRUBIN DIRECT+TOT PNL SERPL-MCNC: 0.3 (ref 0–0.5)
BILIRUBIN DIRECT+TOT PNL SERPL-MCNC: 0.6 (ref 0–0.8)
BUDDING YEAST: NORMAL
BUN SERPL-MCNC: 13.7 MG/DL (ref 9.8–20.1)
CALCIUM SERPL-MCNC: 9.6 MG/DL (ref 8.7–10.5)
CASTS, UA: NORMAL
CHLORIDE SERPL-SCNC: 103 MMOL/L (ref 98–107)
CHOLEST SERPL-MCNC: 222 MG/DL
CHOLEST/HDLC SERPL: 4 {RATIO} (ref 0–5)
CO2 SERPL-SCNC: 28 MMOL/L (ref 23–31)
COLOR UR: YELLOW
CREAT SERPL-MCNC: 0.72 MG/DL (ref 0.55–1.02)
CRYSTALS: NORMAL
EOSINOPHIL # BLD AUTO: 0.2 10*3/UL (ref 0–0.9)
EOSINOPHIL NFR BLD AUTO: 2 %
ERYTHROCYTE [DISTWIDTH] IN BLOOD BY AUTOMATED COUNT: 14.6 % (ref 11.5–17)
EST. AVERAGE GLUCOSE BLD GHB EST-MCNC: 119.8 MG/DL
GLOBULIN SER-MCNC: 3 G/DL (ref 2.4–3.5)
GLUCOSE (UA): NEGATIVE
GLUCOSE SERPL-MCNC: 98 MG/DL (ref 82–115)
HBA1C MFR BLD: 5.8 %
HCT VFR BLD AUTO: 37.4 % (ref 37–47)
HDLC SERPL-MCNC: 52 MG/DL (ref 35–60)
HEMOLYSIS INTERF INDEX SERPL-ACNC: 1
HGB BLD-MCNC: 12.7 G/DL (ref 12–16)
HGB UR QL STRIP: NEGATIVE
ICTERIC INTERF INDEX SERPL-ACNC: 1
KETONES UR QL STRIP: NEGATIVE
LDLC SERPL CALC-MCNC: 147 MG/DL (ref 50–140)
LEUKOCYTE ESTERASE UR QL STRIP: NORMAL
LIPEMIC INTERF INDEX SERPL-ACNC: 0
LYMPHOCYTES # BLD AUTO: 2.7 10*3/UL (ref 0.6–4.6)
LYMPHOCYTES NFR BLD AUTO: 35 %
MANUAL DIFF? (OHS): NO
MCH RBC QN AUTO: 27.6 PG (ref 27–31)
MCHC RBC AUTO-ENTMCNC: 34 G/DL (ref 33–36)
MCV RBC AUTO: 81.3 FL (ref 80–94)
MONOCYTES # BLD AUTO: 0.9 10*3/UL (ref 0.1–1.3)
MONOCYTES NFR BLD AUTO: 12 %
NEUTROPHILS # BLD AUTO: 3.84 10*3/UL (ref 2.1–9.2)
NEUTROPHILS NFR BLD AUTO: 50 %
NITRITE UR QL STRIP: NEGATIVE
PH UR STRIP: 6 [PH] (ref 5–9)
PLATELET # BLD AUTO: 339 10*3/UL (ref 130–400)
PMV BLD AUTO: 10.9 FL (ref 9.4–12.4)
POTASSIUM SERPL-SCNC: 3.9 MMOL/L (ref 3.5–5.1)
PROT SERPL-MCNC: 7.3 G/DL (ref 5.8–7.6)
PROT UR QL STRIP: NEGATIVE
RBC # BLD AUTO: 4.6 10*6/UL (ref 4.2–5.4)
RBC #/AREA URNS HPF: NORMAL /[HPF] (ref 0–2)
SMALL ROUND CELLS, UA: NORMAL
SODIUM SERPL-SCNC: 142 MMOL/L (ref 136–145)
SP GR UR STRIP: 1.01 (ref 1–1.03)
SPERM URNS QL MICRO: NORMAL
SQUAMOUS EPITHELIAL, UA: 9 (ref 0–4)
TRIGL SERPL-MCNC: 117 MG/DL (ref 37–140)
UROBILINOGEN UR STRIP-ACNC: 0.2
VLDLC SERPL CALC-MCNC: 23 MG/DL
WBC # SPEC AUTO: 7.7 10*3/UL (ref 4.5–11.5)
WBC #/AREA URNS HPF: 6 /[HPF] (ref 0–3)

## 2022-04-07 ENCOUNTER — HISTORICAL (OUTPATIENT)
Dept: ADMINISTRATIVE | Facility: HOSPITAL | Age: 72
End: 2022-04-07
Payer: MEDICARE

## 2022-04-23 VITALS
SYSTOLIC BLOOD PRESSURE: 128 MMHG | DIASTOLIC BLOOD PRESSURE: 78 MMHG | WEIGHT: 209 LBS | BODY MASS INDEX: 38.46 KG/M2 | HEIGHT: 62 IN | OXYGEN SATURATION: 98 %

## 2022-04-28 NOTE — OP NOTE
Patient:   Rosalie Tatum             MRN: [id number]            FIN: [id number]               Age:   70 years     Sex:  Female     :  1950   Associated Diagnoses:   None   Author:   Edward Alfonso MD      Preoperative diagnosis: Cataract of the  left eye     Postoperative diagnosis: Same     Operative procedure: Cataract extraction with intraocular lens implant    Lens Style: [id number]    The patient was brought to the operating theater by wheelchair and under light sedation given topical anesthesia using 0.75% Marcaine.  After adequate anesthesia the patient was then prepped and draped in usual ophthalmological manner.   Jasiel lid speculums were applied and under the surgical microscope a keratome incision was made temporally using a yanelis keratome blade and a 15° yanelis keratome stab incision was made in the superior nasal quadrant.  Viscoat was instilled into the anterior chamber and an anterior capsulorrhexis was performed using a bent 25-gauge needle and the anterior capsule flap withdrawn with Kelman forceps. Using an irrigating Kelman cystotome the nucleus was irrigated and rocked free from the cortical bed and the handpiece was withdrawn. The phacoemulsification handpiece was introduced into the eye and phacoemulsification carried out the posterior chamber and the iris plane while a nucleus manipulator was used to direct the nucleus fragments  towards the phacoemulsification tip and prevent corneal contact. After phacoemulsification of the nucleus was completed the handpiece was withdrawn and an irrigation-aspiration handpiece was introduced into the eye and all visible cortical fragments were aspirated from the eye without difficulty. The handpiece was withdrawn and Healon was introduced into the eye to inflate the anterior chamber and the capsular bag.  An intraocular lens implant was selected, inspected, folded and placed into the lens injector and then the lens carefully injected  into the capsular bag while the haptics were positioned using the nucleus manipulator. The Healon was then aspirated from the eye using an irrigation-aspiration handpiece and the handpiece withdrawn from the eye. The anterior chamber was inflated with balanced salt solution and the wound checked for water tightness and found to be intact.  The antibiotic drops used preoperatively were instilled into the eye and the patient sent to the outpatient recovery in good condition.

## 2022-05-26 RX ORDER — MELOXICAM 15 MG/1
TABLET ORAL
Qty: 90 TABLET | Refills: 1 | Status: SHIPPED | OUTPATIENT
Start: 2022-05-26 | End: 2022-12-19

## 2022-09-14 ENCOUNTER — TELEPHONE (OUTPATIENT)
Dept: ADMINISTRATIVE | Facility: HOSPITAL | Age: 72
End: 2022-09-14
Payer: MEDICARE

## 2022-09-14 DIAGNOSIS — E78.5 HYPERLIPIDEMIA, UNSPECIFIED HYPERLIPIDEMIA TYPE: ICD-10-CM

## 2022-09-14 DIAGNOSIS — I10 HYPERTENSION, UNSPECIFIED TYPE: Primary | ICD-10-CM

## 2022-09-14 NOTE — TELEPHONE ENCOUNTER
----- Message from Charile Hernandez MA sent at 9/14/2022  8:37 AM CDT -----  Regarding: PV 9/21/22 @ 10:00 Dr. Nelson  1. Are there any outstanding tasks in the patient's chart? Yes, fasting labs    2. Is there any documentation in the chart? No    3.Has patient been seen in an ER, Urgent care clinic, or been admitted since last visit?  If yes, When, where, and why    4. Has patient seen any other healthcare providers since last visit?  If yes, when, where, and why    5. Has patient had any bloodwork or XR done since last visit?    6. Is patient signed up for patient portal?

## 2022-09-14 NOTE — TELEPHONE ENCOUNTER
----- Message from Charlie Hernandez MA sent at 9/14/2022  8:37 AM CDT -----  Regarding: PV 9/21/22 @ 10:00 Dr. Nelson  1. Are there any outstanding tasks in the patient's chart? Yes, fasting labs    2. Is there any documentation in the chart? No    3.Has patient been seen in an ER, Urgent care clinic, or been admitted since last visit?  If yes, When, where, and why    4. Has patient seen any other healthcare providers since last visit?  If yes, when, where, and why    5. Has patient had any bloodwork or XR done since last visit?    6. Is patient signed up for patient portal?

## 2022-09-15 ENCOUNTER — HISTORICAL (OUTPATIENT)
Dept: ADMINISTRATIVE | Facility: HOSPITAL | Age: 72
End: 2022-09-15
Payer: MEDICARE

## 2022-09-29 ENCOUNTER — TELEPHONE (OUTPATIENT)
Dept: ADMINISTRATIVE | Facility: HOSPITAL | Age: 72
End: 2022-09-29
Payer: MEDICARE

## 2022-09-29 NOTE — TELEPHONE ENCOUNTER
----- Message from Charlie Hernandez MA sent at 9/29/2022  8:48 AM CDT -----  Regarding: PV 10/6/22 @ 11:20 Dr. Nelson  1. Are there any outstanding tasks in the patient's chart? Yes, fasting labs    2. Is there any documentation in the chart? No    3.Has patient been seen in an ER, Urgent care clinic, or been admitted since last visit?  If yes, When, where, and why    4. Has patient seen any other healthcare providers since last visit?  If yes, when, where, and why    5. Has patient had any bloodwork or XR done since last visit?    6. Is patient signed up for patient portal?

## 2022-10-04 ENCOUNTER — LAB VISIT (OUTPATIENT)
Dept: LAB | Facility: HOSPITAL | Age: 72
End: 2022-10-04
Attending: INTERNAL MEDICINE
Payer: MEDICARE

## 2022-10-04 DIAGNOSIS — E78.5 HYPERLIPIDEMIA, UNSPECIFIED HYPERLIPIDEMIA TYPE: ICD-10-CM

## 2022-10-04 DIAGNOSIS — I10 HYPERTENSION, UNSPECIFIED TYPE: ICD-10-CM

## 2022-10-04 LAB
ALBUMIN SERPL-MCNC: 4.3 GM/DL (ref 3.4–4.8)
ALBUMIN/GLOB SERPL: 1.5 RATIO (ref 1.1–2)
ALP SERPL-CCNC: 57 UNIT/L (ref 40–150)
ALT SERPL-CCNC: 28 UNIT/L (ref 0–55)
AST SERPL-CCNC: 20 UNIT/L (ref 5–34)
BILIRUBIN DIRECT+TOT PNL SERPL-MCNC: 0.2 MG/DL (ref 0–0.5)
BILIRUBIN DIRECT+TOT PNL SERPL-MCNC: 0.6 MG/DL
BUN SERPL-MCNC: 15.5 MG/DL (ref 9.8–20.1)
CALCIUM SERPL-MCNC: 9.6 MG/DL (ref 8.4–10.2)
CHLORIDE SERPL-SCNC: 107 MMOL/L (ref 98–107)
CHOLEST SERPL-MCNC: 201 MG/DL
CHOLEST/HDLC SERPL: 5 {RATIO} (ref 0–5)
CO2 SERPL-SCNC: 30 MMOL/L (ref 23–31)
CREAT SERPL-MCNC: 0.72 MG/DL (ref 0.55–1.02)
GFR SERPLBLD CREATININE-BSD FMLA CKD-EPI: >60 MLS/MIN/1.73/M2
GLOBULIN SER-MCNC: 2.9 GM/DL (ref 2.4–3.5)
GLUCOSE SERPL-MCNC: 122 MG/DL (ref 82–115)
HDLC SERPL-MCNC: 41 MG/DL (ref 35–60)
LDLC SERPL CALC-MCNC: 121 MG/DL (ref 50–140)
POTASSIUM SERPL-SCNC: 4.3 MMOL/L (ref 3.5–5.1)
PROT SERPL-MCNC: 7.2 GM/DL (ref 5.8–7.6)
SODIUM SERPL-SCNC: 140 MMOL/L (ref 136–145)
TRIGL SERPL-MCNC: 197 MG/DL (ref 37–140)
VLDLC SERPL CALC-MCNC: 39 MG/DL

## 2022-10-04 PROCEDURE — 80061 LIPID PANEL: CPT

## 2022-10-04 PROCEDURE — 80053 COMPREHEN METABOLIC PANEL: CPT

## 2022-10-04 PROCEDURE — 36415 COLL VENOUS BLD VENIPUNCTURE: CPT

## 2022-10-04 PROCEDURE — 82248 BILIRUBIN DIRECT: CPT

## 2022-10-06 ENCOUNTER — OFFICE VISIT (OUTPATIENT)
Dept: INTERNAL MEDICINE | Facility: CLINIC | Age: 72
End: 2022-10-06
Payer: MEDICARE

## 2022-10-06 VITALS
HEIGHT: 62 IN | SYSTOLIC BLOOD PRESSURE: 132 MMHG | HEART RATE: 79 BPM | DIASTOLIC BLOOD PRESSURE: 70 MMHG | WEIGHT: 208 LBS | BODY MASS INDEX: 38.28 KG/M2 | OXYGEN SATURATION: 97 %

## 2022-10-06 DIAGNOSIS — E11.9 TYPE 2 DIABETES MELLITUS WITHOUT COMPLICATION, WITHOUT LONG-TERM CURRENT USE OF INSULIN: Primary | ICD-10-CM

## 2022-10-06 DIAGNOSIS — Z23 FLU VACCINE NEED: ICD-10-CM

## 2022-10-06 DIAGNOSIS — E66.01 SEVERE OBESITY (BMI 35.0-39.9) WITH COMORBIDITY: ICD-10-CM

## 2022-10-06 PROCEDURE — 90694 FLU VACCINE - QUADRIVALENT - ADJUVANTED: ICD-10-PCS | Mod: ,,, | Performed by: INTERNAL MEDICINE

## 2022-10-06 PROCEDURE — 90694 VACC AIIV4 NO PRSRV 0.5ML IM: CPT | Mod: ,,, | Performed by: INTERNAL MEDICINE

## 2022-10-06 PROCEDURE — 99214 PR OFFICE/OUTPT VISIT, EST, LEVL IV, 30-39 MIN: ICD-10-PCS | Mod: ,,, | Performed by: INTERNAL MEDICINE

## 2022-10-06 PROCEDURE — G0008 FLU VACCINE - QUADRIVALENT - ADJUVANTED: ICD-10-PCS | Mod: ,,, | Performed by: INTERNAL MEDICINE

## 2022-10-06 PROCEDURE — 99214 OFFICE O/P EST MOD 30 MIN: CPT | Mod: ,,, | Performed by: INTERNAL MEDICINE

## 2022-10-06 PROCEDURE — G0008 ADMIN INFLUENZA VIRUS VAC: HCPCS | Mod: ,,, | Performed by: INTERNAL MEDICINE

## 2022-10-06 RX ORDER — CARVEDILOL 12.5 MG/1
12.5 TABLET ORAL 2 TIMES DAILY
COMMUNITY
Start: 2022-09-16

## 2022-10-06 RX ORDER — ERGOCALCIFEROL 1.25 MG/1
50000 CAPSULE ORAL
COMMUNITY
Start: 2022-07-12

## 2022-10-06 RX ORDER — SEMAGLUTIDE 1.34 MG/ML
0.25 INJECTION, SOLUTION SUBCUTANEOUS
Qty: 1 PEN | Refills: 5 | Status: SHIPPED | OUTPATIENT
Start: 2022-10-06 | End: 2022-11-03

## 2022-10-06 RX ORDER — HYDROCHLOROTHIAZIDE 25 MG/1
25 TABLET ORAL DAILY
COMMUNITY
Start: 2022-06-11 | End: 2022-10-06

## 2022-10-06 RX ORDER — BENZONATATE 200 MG/1
200 CAPSULE ORAL 3 TIMES DAILY PRN
COMMUNITY
Start: 2022-09-08 | End: 2022-10-06 | Stop reason: ALTCHOICE

## 2022-10-06 NOTE — PROGRESS NOTES
Subjective:      Patient ID: Rosalie Tatum is a 71 y.o. female.    Chief Complaint: Follow-up (6 mos)      HPI:   71 year old female here for 6 month revisit   Single, no children.  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  Last C scope in .  Due for repeat C scope in 2020.  Dr. Beard-GYN, up-to-date with Pap and mammogram  Dr. Perez-podiatry  Dr. Uribe-orthopedic, receives LESI for L4-L5 radiculopathy  Dr. Augustin-orthopedic, injections for trigger finger  Has a brother with hx of colon cancer  Mother  at 76 from sepsis, history of diabetes.  Father  at age 91 with history of pancreatic cancer.  Has sleep apnea and is compliant with CPAP machine.  Fasting sugar is high; weight is up 10lbs  Had COVID beginning of September          Past Medical History:  Past Medical History:   Diagnosis Date    Arthritis     Hypertension      Past Surgical History:   Procedure Laterality Date    COLON SURGERY       Review of patient's allergies indicates:   Allergen Reactions    Codeine Nausea Only     Current Outpatient Medications on File Prior to Visit   Medication Sig Dispense Refill    carvediloL (COREG) 12.5 MG tablet Take 12.5 mg by mouth 2 (two) times daily.      ergocalciferol (ERGOCALCIFEROL) 50,000 unit Cap Take 50,000 Units by mouth every 7 days.      ergocalciferol, vitamin D2, (VITAMIN D2 ORAL) Take by mouth.      linaCLOtide (LINZESS) 72 mcg Cap capsule Take 72 mcg by mouth once daily.      losartan-hydrochlorothiazide 50-12.5 mg (HYZAAR) 50-12.5 mg per tablet Take 1 tablet by mouth once daily.      meloxicam (MOBIC) 15 MG tablet TAKE 1 TABLET BY MOUTH EVERY DAY 90 tablet 1    multivitamin capsule Take 1 capsule by mouth once daily.      polyethylene glycol (GLYCOLAX) 17 gram PwPk Take 17 g by mouth once daily. 24 packet 0    vitamin E 200 UNIT capsule Take 200 Units  "by mouth once daily.      [DISCONTINUED] benzonatate (TESSALON) 200 MG capsule Take 200 mg by mouth 3 (three) times daily as needed.      albuterol 90 mcg/actuation inhaler Inhale 1-2 puffs into the lungs every 6 (six) hours as needed for Wheezing (coughing). 1 Inhaler 0    levocetirizine (XYZAL) 5 MG tablet Take 1 tablet (5 mg total) by mouth every evening. 20 tablet 0    [DISCONTINUED] atenolol (TENORMIN) 50 MG tablet Take 50 mg by mouth once daily.      [DISCONTINUED] hydroCHLOROthiazide (HYDRODIURIL) 25 MG tablet Take 25 mg by mouth once daily.       No current facility-administered medications on file prior to visit.     Social History     Socioeconomic History    Marital status:    Tobacco Use    Smoking status: Never    Smokeless tobacco: Never   Substance and Sexual Activity    Alcohol use: No    Drug use: No    Sexual activity: Yes     Partners: Male     Family History   Problem Relation Age of Onset    Diabetes Mother     Cancer Father        Review of Systems  A comprehensive review of systems was performed and was negative with exception of what is documented above.     Objective:   /70   Pulse 79   Ht 5' 2" (1.575 m)   Wt 94.3 kg (208 lb)   SpO2 97%   BMI 38.04 kg/m²   Physical Exam  General : Alert and oriented, No acute distress, afebrile.  Eye : PERRLA. EOMI. Normal conjunctiva, Sclerae are nonicteric.   Respiratory : Respirations are non-labored and clear to auscultation bilaterally. Symmetrical air entry bilaterally, no crackles, no wheezes, no rhonchi. No cyanosis, no clubbing.  Cardiovascular : Normal rate, Regular rhythm. No murmurs, rubs, or gallops. Pulses are 2+ throughout. No JVD. No Edema.  Gastrointestinal : Soft, nontender, non-distended, bowel sounds are present in all quadrants, no organomegaly, no guarding, no rebound.  Musculoskeletal : Normal range of motion throughout. No muscle tenderness.  Integumentary : Warm, moist, intact.  Neurologic : Alert, " Oriented  Psychiatric : Cooperative, Appropriate mood & affect.   Assessment/ Plan:   1. Type 2 diabetes mellitus without complication, without long-term current use of insulin  -     Hemoglobin A1C; Future; Expected date: 01/06/2023  -     Comprehensive Metabolic Panel; Future; Expected date: 01/06/2023    2. Severe obesity (BMI 35.0-39.9) with comorbidity    Other orders  -     semaglutide (OZEMPIC) 0.25 mg or 0.5 mg(2 mg/1.5 mL) pen injector; Inject 0.25 mg into the skin every 7 days. Call md after 4 weeks to increase the dose  Dispense: 1 pen; Refill: 5           Follow up in about 12 weeks (around 12/29/2022) for DIABETIC REVISIT, with labs prior to visit with Dr MOTLEY

## 2022-11-03 ENCOUNTER — TELEPHONE (OUTPATIENT)
Dept: INTERNAL MEDICINE | Facility: CLINIC | Age: 72
End: 2022-11-03
Payer: MEDICARE

## 2022-11-03 DIAGNOSIS — E11.9 TYPE 2 DIABETES MELLITUS WITHOUT COMPLICATION, WITHOUT LONG-TERM CURRENT USE OF INSULIN: Primary | ICD-10-CM

## 2022-11-03 RX ORDER — SEMAGLUTIDE 1.34 MG/ML
0.5 INJECTION, SOLUTION SUBCUTANEOUS
Qty: 1 PEN | Refills: 11 | Status: SHIPPED | OUTPATIENT
Start: 2022-11-03 | End: 2023-01-09

## 2022-11-03 NOTE — TELEPHONE ENCOUNTER
----- Message from Ynes Brady sent at 11/3/2022  1:14 PM CDT -----  Regarding: Meds  .Type:  RX Refill Request    Who Called: Pt  Refill or New Rx:Refill  RX Name and Strength:semaglutide (OZEMPIC) 0.25 mg or 0.5 mg(2 mg/1.5 mL) pen injector  How is the patient currently taking it? (ex. 1XDay):  Is this a 30 day or 90 day RX:  Preferred Pharmacy with phone number:[facility] #[id number]  [place], [place] - [address]  Local or Mail Order:Local  Ordering Provider:Leslie  Would the patient rather a call back or a response via [facility]? Call back  Best Call Back Number:[phone]  Additional Information: Pt states med box said to call after 4 weeks to increase dosage.

## 2022-11-09 ENCOUNTER — DOCUMENTATION ONLY (OUTPATIENT)
Dept: ADMINISTRATIVE | Facility: HOSPITAL | Age: 72
End: 2022-11-09
Payer: MEDICARE

## 2023-01-03 ENCOUNTER — TELEPHONE (OUTPATIENT)
Dept: INTERNAL MEDICINE | Facility: CLINIC | Age: 73
End: 2023-01-03
Payer: MEDICARE

## 2023-01-03 NOTE — TELEPHONE ENCOUNTER
----- Message from Charlie Hernandez MA sent at 1/3/2023  9:37 AM CST -----  Regarding: PV 1/9/2023 @ 2:40 Dr. Nelson  1. Are there any outstanding tasks in the patient's chart? Yes, fasting labs    2. Is there any documentation in the chart? No    3.Has patient been seen in an ER, Urgent care clinic, or been admitted since last visit?  If yes, When, where, and why    4. Has patient seen any other healthcare providers since last visit?  If yes, when, where, and why    5. Has patient had any bloodwork or XR done since last visit?    6. Is patient signed up for patient portal?

## 2023-01-05 ENCOUNTER — TELEPHONE (OUTPATIENT)
Dept: INTERNAL MEDICINE | Facility: CLINIC | Age: 73
End: 2023-01-05
Payer: MEDICARE

## 2023-01-05 ENCOUNTER — LAB VISIT (OUTPATIENT)
Dept: LAB | Facility: HOSPITAL | Age: 73
End: 2023-01-05
Attending: INTERNAL MEDICINE
Payer: MEDICARE

## 2023-01-05 DIAGNOSIS — E11.9 TYPE 2 DIABETES MELLITUS WITHOUT COMPLICATION, WITHOUT LONG-TERM CURRENT USE OF INSULIN: ICD-10-CM

## 2023-01-05 DIAGNOSIS — E11.9 TYPE 2 DIABETES MELLITUS WITHOUT COMPLICATION, WITHOUT LONG-TERM CURRENT USE OF INSULIN: Primary | ICD-10-CM

## 2023-01-05 DIAGNOSIS — E78.5 HYPERLIPIDEMIA, UNSPECIFIED HYPERLIPIDEMIA TYPE: ICD-10-CM

## 2023-01-05 DIAGNOSIS — E66.01 SEVERE OBESITY (BMI 35.0-39.9) WITH COMORBIDITY: ICD-10-CM

## 2023-01-05 LAB
ALBUMIN SERPL-MCNC: 4.1 G/DL (ref 3.4–4.8)
ALBUMIN/GLOB SERPL: 1.5 RATIO (ref 1.1–2)
ALP SERPL-CCNC: 49 UNIT/L (ref 40–150)
ALT SERPL-CCNC: 21 UNIT/L (ref 0–55)
AST SERPL-CCNC: 20 UNIT/L (ref 5–34)
BILIRUBIN DIRECT+TOT PNL SERPL-MCNC: 0.7 MG/DL
BUN SERPL-MCNC: 12.6 MG/DL (ref 9.8–20.1)
CALCIUM SERPL-MCNC: 9.5 MG/DL (ref 8.4–10.2)
CHLORIDE SERPL-SCNC: 109 MMOL/L (ref 98–107)
CO2 SERPL-SCNC: 28 MMOL/L (ref 23–31)
CREAT SERPL-MCNC: 0.78 MG/DL (ref 0.55–1.02)
EST. AVERAGE GLUCOSE BLD GHB EST-MCNC: 114 MG/DL
GFR SERPLBLD CREATININE-BSD FMLA CKD-EPI: >60 MLS/MIN/1.73/M2
GLOBULIN SER-MCNC: 2.8 GM/DL (ref 2.4–3.5)
GLUCOSE SERPL-MCNC: 94 MG/DL (ref 82–115)
HBA1C MFR BLD: 5.6 %
POTASSIUM SERPL-SCNC: 4 MMOL/L (ref 3.5–5.1)
PROT SERPL-MCNC: 6.9 GM/DL (ref 5.8–7.6)
SODIUM SERPL-SCNC: 143 MMOL/L (ref 136–145)

## 2023-01-05 PROCEDURE — 83036 HEMOGLOBIN GLYCOSYLATED A1C: CPT

## 2023-01-05 PROCEDURE — 36415 COLL VENOUS BLD VENIPUNCTURE: CPT

## 2023-01-05 PROCEDURE — 80053 COMPREHEN METABOLIC PANEL: CPT

## 2023-01-09 ENCOUNTER — OFFICE VISIT (OUTPATIENT)
Dept: INTERNAL MEDICINE | Facility: CLINIC | Age: 73
End: 2023-01-09
Payer: MEDICARE

## 2023-01-09 VITALS
SYSTOLIC BLOOD PRESSURE: 134 MMHG | WEIGHT: 197 LBS | RESPIRATION RATE: 16 BRPM | HEIGHT: 63 IN | DIASTOLIC BLOOD PRESSURE: 76 MMHG | HEART RATE: 105 BPM | TEMPERATURE: 97 F | OXYGEN SATURATION: 98 % | BODY MASS INDEX: 34.91 KG/M2

## 2023-01-09 DIAGNOSIS — E11.9 TYPE 2 DIABETES MELLITUS WITHOUT COMPLICATION, WITHOUT LONG-TERM CURRENT USE OF INSULIN: ICD-10-CM

## 2023-01-09 PROCEDURE — 99214 PR OFFICE/OUTPT VISIT, EST, LEVL IV, 30-39 MIN: ICD-10-PCS | Mod: ,,, | Performed by: INTERNAL MEDICINE

## 2023-01-09 PROCEDURE — 99214 OFFICE O/P EST MOD 30 MIN: CPT | Mod: ,,, | Performed by: INTERNAL MEDICINE

## 2023-01-09 RX ORDER — OLMESARTAN MEDOXOMIL 40 MG/1
40 TABLET ORAL DAILY
COMMUNITY
End: 2024-02-29

## 2023-01-09 RX ORDER — HYDROCHLOROTHIAZIDE 25 MG/1
25 TABLET ORAL DAILY
COMMUNITY
End: 2023-06-12

## 2023-01-09 RX ORDER — SEMAGLUTIDE 1.34 MG/ML
1 INJECTION, SOLUTION SUBCUTANEOUS
Qty: 1 PEN | Refills: 11 | Status: SHIPPED | OUTPATIENT
Start: 2023-01-09 | End: 2023-02-10 | Stop reason: SDUPTHER

## 2023-01-09 RX ORDER — ZINC GLUCONATE 50 MG
50 TABLET ORAL DAILY
COMMUNITY
End: 2023-08-30

## 2023-01-09 NOTE — PROGRESS NOTES
Subjective:      Patient ID: Rosalie Tatum is a 72 y.o. female.    Chief Complaint: Diabetes (3 month F/U)      HPI:  71 year old female here for 6 month revisit   Single, no children.  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  Last C scope in .  Due for repeat C scope in 2020 due in   Dr. Beard-GYN, up-to-date with Pap and mammogram  Dr. Perez-podiatry  Dr. Uribe-orthopedic, receives LESI for L4-L5 radiculopathy  Dr. Augustin-orthopedic, injections for trigger finger  Has a brother with hx of colon cancer  Mother  at 76 from sepsis, history of diabetes.  Father  at age 91 with history of pancreatic cancer.  Has sleep apnea and is compliant with CPAP machine.  On Ozempic 0.5mg and tolerating  Fasting sugar is less then 100  A1c at 5.6      Past Medical History:  Past Medical History:   Diagnosis Date    Arthritis     Hypertension      Past Surgical History:   Procedure Laterality Date    COLON SURGERY      COLONOSCOPY  2020     Review of patient's allergies indicates:   Allergen Reactions    Codeine Nausea Only     Other reaction(s): Not Indicated     Current Outpatient Medications on File Prior to Visit   Medication Sig Dispense Refill    carvediloL (COREG) 12.5 MG tablet Take 12.5 mg by mouth 2 (two) times daily.      ergocalciferol (ERGOCALCIFEROL) 50,000 unit Cap Take 50,000 Units by mouth every 7 days.      hydroCHLOROthiazide (HYDRODIURIL) 25 MG tablet Take 25 mg by mouth once daily.      Lactobacillus rhamnosus GG (CULTURELLE) 10 billion cell capsule Take 1 capsule by mouth once daily.      linaCLOtide (LINZESS) 72 mcg Cap capsule Take 72 mcg by mouth once daily.      meloxicam (MOBIC) 15 MG tablet Take 1 tablet (15 mg total) by mouth once daily. 90 tablet 3    multivitamin capsule Take 1 capsule by mouth once daily.      olmesartan (BENICAR) 40 MG  "tablet Take 40 mg by mouth once daily.      polyethylene glycol (GLYCOLAX) 17 gram PwPk Take 17 g by mouth once daily. 24 packet 0    vitamin E 200 UNIT capsule Take 200 Units by mouth once daily.      zinc gluconate 50 mg tablet Take 50 mg by mouth once daily.      [DISCONTINUED] semaglutide (OZEMPIC) 0.25 mg or 0.5 mg(2 mg/1.5 mL) pen injector Inject 0.5 mg into the skin every 7 days. Call md after 4 weeks to increase the dose 1 pen 11    albuterol 90 mcg/actuation inhaler Inhale 1-2 puffs into the lungs every 6 (six) hours as needed for Wheezing (coughing). 1 Inhaler 0    levocetirizine (XYZAL) 5 MG tablet Take 1 tablet (5 mg total) by mouth every evening. 20 tablet 0    [DISCONTINUED] losartan-hydrochlorothiazide 50-12.5 mg (HYZAAR) 50-12.5 mg per tablet Take 1 tablet by mouth once daily.       No current facility-administered medications on file prior to visit.     Social History     Socioeconomic History    Marital status:    Tobacco Use    Smoking status: Never    Smokeless tobacco: Never   Substance and Sexual Activity    Alcohol use: No    Drug use: No    Sexual activity: Yes     Partners: Male     Family History   Problem Relation Age of Onset    Diabetes Mother     Cancer Father        Review of Systems  A comprehensive review of systems was performed and was negative with exception of what is documented above.     Objective:   /76 (BP Location: Right arm, Patient Position: Sitting, BP Method: Medium (Manual))   Pulse 105   Temp 97.3 °F (36.3 °C) (Temporal)   Resp 16   Ht 5' 3" (1.6 m)   Wt 89.4 kg (197 lb)   SpO2 98%   BMI 34.90 kg/m²   Physical Exam  General : Alert and oriented, No acute distress, afebrile. Obese  Eye : PERRLA. EOMI. Normal conjunctiva, Sclerae are nonicteric.  Respiratory : Respirations are non-labored and clear to auscultation bilaterally. Symmetrical air entry bilaterally, no crackles, no wheezes, no rhonchi. No cyanosis, no clubbing.  Cardiovascular : Normal " rate, Regular rhythm. No murmurs, rubs, or gallops. Pulses are 2+ throughout. No JVD. No Edema.  Gastrointestinal : Soft, nontender, non-distended, bowel sounds are present in all quadrants, no organomegaly, no guarding, no rebound.  Musculoskeletal : Normal range of motion throughout. No muscle tenderness.  Integumentary : Warm, moist, intact.  Neurologic : Alert, Oriented  Psychiatric : Cooperative, Appropriate mood & affect.       Assessment/ Plan:   1. Type 2 diabetes mellitus without complication, without long-term current use of insulin  Assessment & Plan:  Increase Ozempic to 1mg  RTC 6 months  Sooner if needed    Orders:  -     Hemoglobin A1C; Future; Expected date: 07/09/2023  -     Comprehensive Metabolic Panel; Future; Expected date: 07/09/2023    Other orders  -     semaglutide (OZEMPIC) 1 mg/dose (4 mg/3 mL); Inject 1 mg into the skin every 7 days.  Dispense: 1 pen; Refill: 11             Follow up in about 6 months (around 7/9/2023) for DIABETIC REVISIT, NURSE PRACTITIONER, with labs prior to visit.

## 2023-02-08 ENCOUNTER — PATIENT MESSAGE (OUTPATIENT)
Dept: RESEARCH | Facility: HOSPITAL | Age: 73
End: 2023-02-08
Payer: MEDICARE

## 2023-02-10 ENCOUNTER — TELEPHONE (OUTPATIENT)
Dept: INTERNAL MEDICINE | Facility: CLINIC | Age: 73
End: 2023-02-10
Payer: MEDICARE

## 2023-02-10 DIAGNOSIS — E11.9 TYPE 2 DIABETES MELLITUS WITHOUT COMPLICATION, WITHOUT LONG-TERM CURRENT USE OF INSULIN: Primary | ICD-10-CM

## 2023-02-10 RX ORDER — SEMAGLUTIDE 1.34 MG/ML
1 INJECTION, SOLUTION SUBCUTANEOUS
Qty: 1 PEN | Refills: 11 | Status: SHIPPED | OUTPATIENT
Start: 2023-02-10 | End: 2023-07-20

## 2023-02-10 NOTE — TELEPHONE ENCOUNTER
----- Message from Mayte Larkin sent at 2/10/2023 11:27 AM CST -----  Ozempic - 4mg   [facility]

## 2023-03-15 ENCOUNTER — PATIENT MESSAGE (OUTPATIENT)
Dept: ADMINISTRATIVE | Facility: HOSPITAL | Age: 73
End: 2023-03-15
Payer: MEDICARE

## 2023-04-12 ENCOUNTER — DOCUMENTATION ONLY (OUTPATIENT)
Dept: INTERNAL MEDICINE | Facility: CLINIC | Age: 73
End: 2023-04-12
Payer: MEDICARE

## 2023-05-09 LAB
LEFT EYE DM RETINOPATHY: NEGATIVE
RIGHT EYE DM RETINOPATHY: NEGATIVE

## 2023-05-11 DIAGNOSIS — R13.14 DYSPHAGIA, PHARYNGOESOPHAGEAL PHASE: Primary | ICD-10-CM

## 2023-05-15 ENCOUNTER — PATIENT MESSAGE (OUTPATIENT)
Dept: ADMINISTRATIVE | Facility: HOSPITAL | Age: 73
End: 2023-05-15
Payer: MEDICARE

## 2023-05-17 ENCOUNTER — HOSPITAL ENCOUNTER (OUTPATIENT)
Dept: RADIOLOGY | Facility: HOSPITAL | Age: 73
Discharge: HOME | End: 2023-05-17
Attending: NURSE PRACTITIONER
Payer: MEDICARE

## 2023-05-17 DIAGNOSIS — R13.14 DYSPHAGIA, PHARYNGOESOPHAGEAL PHASE: ICD-10-CM

## 2023-05-17 PROCEDURE — 25500020 PHARM REV CODE 255: Performed by: NURSE PRACTITIONER

## 2023-05-17 PROCEDURE — 74220 X-RAY XM ESOPHAGUS 1CNTRST: CPT | Mod: TC

## 2023-05-17 PROCEDURE — A9698 NON-RAD CONTRAST MATERIALNOC: HCPCS | Performed by: NURSE PRACTITIONER

## 2023-05-17 RX ADMIN — BARIUM SULFATE 60 ML: 980 POWDER, FOR SUSPENSION ORAL at 08:05

## 2023-05-17 RX ADMIN — BARIUM SULFATE 60 ML: 0.6 SUSPENSION ORAL at 08:05

## 2023-05-25 ENCOUNTER — PATIENT MESSAGE (OUTPATIENT)
Dept: ADMINISTRATIVE | Facility: HOSPITAL | Age: 73
End: 2023-05-25
Payer: MEDICARE

## 2023-06-01 ENCOUNTER — DOCUMENTATION ONLY (OUTPATIENT)
Dept: ADMINISTRATIVE | Facility: HOSPITAL | Age: 73
End: 2023-06-01
Payer: MEDICARE

## 2023-06-10 DIAGNOSIS — E11.9 TYPE 2 DIABETES MELLITUS WITHOUT COMPLICATION, WITHOUT LONG-TERM CURRENT USE OF INSULIN: Primary | ICD-10-CM

## 2023-06-12 RX ORDER — HYDROCHLOROTHIAZIDE 25 MG/1
TABLET ORAL
Qty: 90 TABLET | Refills: 4 | Status: SHIPPED | OUTPATIENT
Start: 2023-06-12 | End: 2024-02-29

## 2023-06-19 ENCOUNTER — PATIENT OUTREACH (OUTPATIENT)
Dept: ADMINISTRATIVE | Facility: HOSPITAL | Age: 73
End: 2023-06-19
Payer: MEDICARE

## 2023-06-19 NOTE — PROGRESS NOTES
The following record(s)  below were uploaded for Health Maintenance .    DM EYE EXAM 05/09/2023    Population Health Outreach.

## 2023-07-05 ENCOUNTER — TELEPHONE (OUTPATIENT)
Dept: INTERNAL MEDICINE | Facility: CLINIC | Age: 73
End: 2023-07-05
Payer: MEDICARE

## 2023-07-05 NOTE — TELEPHONE ENCOUNTER
----- Message from Charlie Hernandez MA sent at 7/5/2023 10:26 AM CDT -----  Regarding: PV 7/12/23 @ 1:40 Dr. Nelson  1. Are there any outstanding tasks in the patient's chart? Yes, fasting labs    2. Is there any documentation in the chart? No    3.Has patient been seen in an ER, Urgent care clinic, or been admitted since last visit?  If yes, When, where, and why    4. Has patient seen any other healthcare providers since last visit?  If yes, when, where, and why    5. Has patient had any bloodwork or XR done since last visit?    6. Is patient signed up for patient portal?

## 2023-07-10 ENCOUNTER — TELEPHONE (OUTPATIENT)
Dept: INTERNAL MEDICINE | Facility: CLINIC | Age: 73
End: 2023-07-10
Payer: MEDICARE

## 2023-07-10 NOTE — TELEPHONE ENCOUNTER
----- Message from Charlie Hernandez MA sent at 7/10/2023  9:11 AM CDT -----  Regarding: VITO 7/17/23 @ 9:20 Dr. Nelson  1. Are there any outstanding tasks in the patient's chart? Yes, fasting labs    2. Is there any documentation in the chart? No    3.Has patient been seen in an ER, Urgent care clinic, or been admitted since last visit?  If yes, When, where, and why    4. Has patient seen any other healthcare providers since last visit?  If yes, when, where, and why    5. Has patient had any bloodwork or XR done since last visit?    6. Is patient signed up for patient portal?

## 2023-07-11 ENCOUNTER — LAB VISIT (OUTPATIENT)
Dept: LAB | Facility: HOSPITAL | Age: 73
End: 2023-07-11
Attending: INTERNAL MEDICINE
Payer: MEDICARE

## 2023-07-11 DIAGNOSIS — E11.9 TYPE 2 DIABETES MELLITUS WITHOUT COMPLICATION, WITHOUT LONG-TERM CURRENT USE OF INSULIN: ICD-10-CM

## 2023-07-11 LAB
ALBUMIN SERPL-MCNC: 4.2 G/DL (ref 3.4–4.8)
ALBUMIN/GLOB SERPL: 1.5 RATIO (ref 1.1–2)
ALP SERPL-CCNC: 58 UNIT/L (ref 40–150)
ALT SERPL-CCNC: 16 UNIT/L (ref 0–55)
AST SERPL-CCNC: 17 UNIT/L (ref 5–34)
BILIRUBIN DIRECT+TOT PNL SERPL-MCNC: 0.7 MG/DL
BUN SERPL-MCNC: 15.2 MG/DL (ref 9.8–20.1)
CALCIUM SERPL-MCNC: 9.2 MG/DL (ref 8.4–10.2)
CHLORIDE SERPL-SCNC: 106 MMOL/L (ref 98–107)
CO2 SERPL-SCNC: 27 MMOL/L (ref 23–31)
CREAT SERPL-MCNC: 0.81 MG/DL (ref 0.55–1.02)
EST. AVERAGE GLUCOSE BLD GHB EST-MCNC: 102.5 MG/DL
GFR SERPLBLD CREATININE-BSD FMLA CKD-EPI: >60 MLS/MIN/1.73/M2
GLOBULIN SER-MCNC: 2.8 GM/DL (ref 2.4–3.5)
GLUCOSE SERPL-MCNC: 96 MG/DL (ref 82–115)
HBA1C MFR BLD: 5.2 %
POTASSIUM SERPL-SCNC: 3.8 MMOL/L (ref 3.5–5.1)
PROT SERPL-MCNC: 7 GM/DL (ref 5.8–7.6)
SODIUM SERPL-SCNC: 142 MMOL/L (ref 136–145)

## 2023-07-11 PROCEDURE — 80053 COMPREHEN METABOLIC PANEL: CPT

## 2023-07-11 PROCEDURE — 36415 COLL VENOUS BLD VENIPUNCTURE: CPT

## 2023-07-11 PROCEDURE — 83036 HEMOGLOBIN GLYCOSYLATED A1C: CPT

## 2023-07-13 ENCOUNTER — HOSPITAL ENCOUNTER (EMERGENCY)
Facility: HOSPITAL | Age: 73
Discharge: HOME | End: 2023-07-13
Attending: EMERGENCY MEDICINE
Payer: MEDICARE

## 2023-07-13 VITALS
HEART RATE: 105 BPM | WEIGHT: 188.5 LBS | DIASTOLIC BLOOD PRESSURE: 93 MMHG | SYSTOLIC BLOOD PRESSURE: 178 MMHG | HEIGHT: 63 IN | TEMPERATURE: 98 F | OXYGEN SATURATION: 95 % | BODY MASS INDEX: 33.4 KG/M2 | RESPIRATION RATE: 16 BRPM

## 2023-07-13 DIAGNOSIS — R11.2 NAUSEA AND VOMITING, UNSPECIFIED VOMITING TYPE: Primary | ICD-10-CM

## 2023-07-13 DIAGNOSIS — R10.13 EPIGASTRIC PAIN: ICD-10-CM

## 2023-07-13 LAB
ALBUMIN SERPL BCP-MCNC: 4.6 G/DL (ref 3.5–5.2)
ALP SERPL-CCNC: 64 U/L (ref 55–135)
ALT SERPL W/O P-5'-P-CCNC: 21 U/L (ref 10–44)
ANION GAP SERPL CALC-SCNC: 13 MMOL/L (ref 8–16)
AST SERPL-CCNC: 23 U/L (ref 10–40)
BACTERIA #/AREA URNS AUTO: ABNORMAL /HPF
BASOPHILS # BLD AUTO: 0.06 K/UL (ref 0–0.2)
BASOPHILS NFR BLD: 0.3 % (ref 0–1.9)
BILIRUB SERPL-MCNC: 0.5 MG/DL (ref 0.1–1)
BILIRUB UR QL STRIP: NEGATIVE
BNP SERPL-MCNC: 14 PG/ML (ref 0–99)
BUN SERPL-MCNC: 13 MG/DL (ref 8–23)
CALCIUM SERPL-MCNC: 9.6 MG/DL (ref 8.7–10.5)
CHLORIDE SERPL-SCNC: 108 MMOL/L (ref 95–110)
CK SERPL-CCNC: 250 U/L (ref 20–180)
CLARITY UR REFRACT.AUTO: ABNORMAL
CO2 SERPL-SCNC: 21 MMOL/L (ref 23–29)
COLOR UR AUTO: YELLOW
CREAT SERPL-MCNC: 0.9 MG/DL (ref 0.5–1.4)
DIFFERENTIAL METHOD: ABNORMAL
EOSINOPHIL # BLD AUTO: 0.1 K/UL (ref 0–0.5)
EOSINOPHIL NFR BLD: 0.4 % (ref 0–8)
ERYTHROCYTE [DISTWIDTH] IN BLOOD BY AUTOMATED COUNT: 14.2 % (ref 11.5–14.5)
EST. GFR  (NO RACE VARIABLE): >60 ML/MIN/1.73 M^2
GLUCOSE SERPL-MCNC: 120 MG/DL (ref 70–110)
GLUCOSE UR QL STRIP: NEGATIVE
HCT VFR BLD AUTO: 37.6 % (ref 37–48.5)
HEP C VIRUS HOLD SPECIMEN: NORMAL
HGB BLD-MCNC: 13 G/DL (ref 12–16)
HGB UR QL STRIP: ABNORMAL
HYALINE CASTS UR QL AUTO: 1 /LPF
IMM GRANULOCYTES # BLD AUTO: 0.15 K/UL (ref 0–0.04)
IMM GRANULOCYTES NFR BLD AUTO: 0.9 % (ref 0–0.5)
KETONES UR QL STRIP: NEGATIVE
LEUKOCYTE ESTERASE UR QL STRIP: ABNORMAL
LIPASE SERPL-CCNC: 46 U/L (ref 4–60)
LYMPHOCYTES # BLD AUTO: 1.4 K/UL (ref 1–4.8)
LYMPHOCYTES NFR BLD: 8.1 % (ref 18–48)
MCH RBC QN AUTO: 28.1 PG (ref 27–31)
MCHC RBC AUTO-ENTMCNC: 34.6 G/DL (ref 32–36)
MCV RBC AUTO: 81 FL (ref 82–98)
MICROSCOPIC COMMENT: ABNORMAL
MONOCYTES # BLD AUTO: 1.3 K/UL (ref 0.3–1)
MONOCYTES NFR BLD: 7.5 % (ref 4–15)
NEUTROPHILS # BLD AUTO: 14.4 K/UL (ref 1.8–7.7)
NEUTROPHILS NFR BLD: 82.8 % (ref 38–73)
NITRITE UR QL STRIP: NEGATIVE
NRBC BLD-RTO: 0 /100 WBC
PH UR STRIP: 6 [PH] (ref 5–8)
PLATELET # BLD AUTO: 352 K/UL (ref 150–450)
PMV BLD AUTO: 10.1 FL (ref 9.2–12.9)
POTASSIUM SERPL-SCNC: 3.9 MMOL/L (ref 3.5–5.1)
PROT SERPL-MCNC: 8 G/DL (ref 6–8.4)
PROT UR QL STRIP: ABNORMAL
RBC # BLD AUTO: 4.62 M/UL (ref 4–5.4)
RBC #/AREA URNS AUTO: 1 /HPF (ref 0–4)
SODIUM SERPL-SCNC: 142 MMOL/L (ref 136–145)
SP GR UR STRIP: 1.02 (ref 1–1.03)
SQUAMOUS #/AREA URNS AUTO: 5 /HPF
TROPONIN I SERPL DL<=0.01 NG/ML-MCNC: <0.006 NG/ML (ref 0–0.03)
URN SPEC COLLECT METH UR: ABNORMAL
UROBILINOGEN UR STRIP-ACNC: NEGATIVE EU/DL
WBC # BLD AUTO: 17.44 K/UL (ref 3.9–12.7)
WBC #/AREA URNS AUTO: 3 /HPF (ref 0–5)

## 2023-07-13 PROCEDURE — 80053 COMPREHEN METABOLIC PANEL: CPT | Mod: ER | Performed by: EMERGENCY MEDICINE

## 2023-07-13 PROCEDURE — 25000003 PHARM REV CODE 250: Mod: ER | Performed by: EMERGENCY MEDICINE

## 2023-07-13 PROCEDURE — 81000 URINALYSIS NONAUTO W/SCOPE: CPT | Mod: ER | Performed by: EMERGENCY MEDICINE

## 2023-07-13 PROCEDURE — 82550 ASSAY OF CK (CPK): CPT | Mod: ER | Performed by: EMERGENCY MEDICINE

## 2023-07-13 PROCEDURE — 85025 COMPLETE CBC W/AUTO DIFF WBC: CPT | Mod: ER | Performed by: EMERGENCY MEDICINE

## 2023-07-13 PROCEDURE — 86803 HEPATITIS C AB TEST: CPT | Performed by: EMERGENCY MEDICINE

## 2023-07-13 PROCEDURE — 93005 ELECTROCARDIOGRAM TRACING: CPT | Mod: ER

## 2023-07-13 PROCEDURE — 83880 ASSAY OF NATRIURETIC PEPTIDE: CPT | Mod: ER | Performed by: EMERGENCY MEDICINE

## 2023-07-13 PROCEDURE — 93010 EKG 12-LEAD: ICD-10-PCS | Mod: ,,, | Performed by: STUDENT IN AN ORGANIZED HEALTH CARE EDUCATION/TRAINING PROGRAM

## 2023-07-13 PROCEDURE — 84484 ASSAY OF TROPONIN QUANT: CPT | Mod: ER | Performed by: EMERGENCY MEDICINE

## 2023-07-13 PROCEDURE — 93010 ELECTROCARDIOGRAM REPORT: CPT | Mod: ,,, | Performed by: STUDENT IN AN ORGANIZED HEALTH CARE EDUCATION/TRAINING PROGRAM

## 2023-07-13 PROCEDURE — 99285 EMERGENCY DEPT VISIT HI MDM: CPT | Mod: 25,ER

## 2023-07-13 PROCEDURE — 87389 HIV-1 AG W/HIV-1&-2 AB AG IA: CPT | Performed by: EMERGENCY MEDICINE

## 2023-07-13 PROCEDURE — 83690 ASSAY OF LIPASE: CPT | Mod: ER | Performed by: EMERGENCY MEDICINE

## 2023-07-13 RX ORDER — ONDANSETRON 4 MG/1
4 TABLET, ORALLY DISINTEGRATING ORAL EVERY 6 HOURS PRN
Qty: 12 TABLET | Refills: 0 | Status: SHIPPED | OUTPATIENT
Start: 2023-07-13 | End: 2023-07-13 | Stop reason: SDUPTHER

## 2023-07-13 RX ORDER — ONDANSETRON 4 MG/1
4 TABLET, ORALLY DISINTEGRATING ORAL EVERY 6 HOURS PRN
Qty: 12 TABLET | Refills: 0 | Status: SHIPPED | OUTPATIENT
Start: 2023-07-13 | End: 2023-08-30

## 2023-07-13 RX ORDER — MAG HYDROX/ALUMINUM HYD/SIMETH 200-200-20
30 SUSPENSION, ORAL (FINAL DOSE FORM) ORAL
Status: DISCONTINUED | OUTPATIENT
Start: 2023-07-13 | End: 2023-07-13 | Stop reason: HOSPADM

## 2023-07-13 RX ORDER — ONDANSETRON 4 MG/1
4 TABLET, ORALLY DISINTEGRATING ORAL
Status: COMPLETED | OUTPATIENT
Start: 2023-07-13 | End: 2023-07-13

## 2023-07-13 RX ADMIN — ONDANSETRON 4 MG: 4 TABLET, ORALLY DISINTEGRATING ORAL at 04:07

## 2023-07-13 NOTE — ED PROVIDER NOTES
Encounter Date: 7/13/2023       History     Chief Complaint   Patient presents with    Abdominal Pain     ABD pain (cramping), nausea, and vomiting that started today after taking mylanta; soft stools.     The history is provided by the patient.   Abdominal Pain  The current episode started 3 to 5 hours ago. The abdominal pain is located in the epigastric region. The abdominal pain does not radiate. The other symptoms of the illness include nausea and vomiting. The other symptoms of the illness do not include fever, shortness of breath, diarrhea or dysuria.   Nausea began today.   The vomiting began today.   Symptoms associated with the illness do not include back pain.   Review of patient's allergies indicates:   Allergen Reactions    Codeine Nausea Only     Other reaction(s): Not Indicated     Past Medical History:   Diagnosis Date    Arthritis     Hiatal hernia     Hypertension      Past Surgical History:   Procedure Laterality Date    COLON SURGERY      COLONOSCOPY  07/21/2020     Family History   Problem Relation Age of Onset    Diabetes Mother     Cancer Father      Social History     Tobacco Use    Smoking status: Never    Smokeless tobacco: Never   Substance Use Topics    Alcohol use: No    Drug use: No     Review of Systems   Constitutional:  Negative for fever.   HENT:  Negative for sore throat.    Respiratory:  Negative for shortness of breath.    Cardiovascular:  Negative for chest pain.   Gastrointestinal:  Positive for abdominal pain, nausea and vomiting. Negative for diarrhea.   Genitourinary:  Negative for dysuria.   Musculoskeletal:  Negative for back pain.   Skin:  Negative for rash.   Neurological:  Negative for weakness.   Hematological:  Does not bruise/bleed easily.     Physical Exam     Initial Vitals [07/13/23 1534]   BP Pulse Resp Temp SpO2   (!) 178/93 105 16 97.8 °F (36.6 °C) 95 %      MAP       --           Physical Exam    Nursing note and vitals reviewed.  Constitutional: She appears  well-developed and well-nourished. No distress.   HENT:   Head: Normocephalic and atraumatic.   Mouth/Throat: Oropharynx is clear and moist.   Eyes: Conjunctivae and EOM are normal. Pupils are equal, round, and reactive to light.   Neck: Neck supple.   Normal range of motion.  Cardiovascular:  Normal rate, regular rhythm and normal heart sounds.     Exam reveals no gallop and no friction rub.       No murmur heard.  Pulmonary/Chest: Breath sounds normal. No respiratory distress. She has no wheezes. She has no rhonchi. She has no rales.   Abdominal: Abdomen is soft. Bowel sounds are normal. She exhibits no distension and no mass. There is no abdominal tenderness. There is no rebound and no guarding.   Musculoskeletal:         General: No tenderness or edema. Normal range of motion.      Cervical back: Normal range of motion and neck supple.     Neurological: She is alert and oriented to person, place, and time. She has normal strength.   Skin: Skin is warm and dry. No rash noted.   Psychiatric: She has a normal mood and affect. Thought content normal.       ED Course   Procedures  Labs Reviewed   CBC W/ AUTO DIFFERENTIAL - Abnormal; Notable for the following components:       Result Value    WBC 17.44 (*)     MCV 81 (*)     Immature Granulocytes 0.9 (*)     Gran # (ANC) 14.4 (*)     Immature Grans (Abs) 0.15 (*)     Mono # 1.3 (*)     Gran % 82.8 (*)     Lymph % 8.1 (*)     All other components within normal limits    Narrative:     Release to patient->Immediate   COMPREHENSIVE METABOLIC PANEL - Abnormal; Notable for the following components:    CO2 21 (*)     Glucose 120 (*)     All other components within normal limits    Narrative:     Release to patient->Immediate   URINALYSIS, REFLEX TO URINE CULTURE - Abnormal; Notable for the following components:    Appearance, UA Hazy (*)     Protein, UA Trace (*)     Occult Blood UA Trace (*)     Leukocytes, UA Trace (*)     All other components within normal limits     Narrative:     Specimen Source->Urine   CK - Abnormal; Notable for the following components:     (*)     All other components within normal limits    Narrative:     Release to patient->Immediate   URINALYSIS MICROSCOPIC - Abnormal; Notable for the following components:    Bacteria Few (*)     All other components within normal limits    Narrative:     Specimen Source->Urine   LIPASE    Narrative:     Release to patient->Immediate   B-TYPE NATRIURETIC PEPTIDE    Narrative:     Release to patient->Immediate   TROPONIN I    Narrative:     Release to patient->Immediate   HIV 1 / 2 ANTIBODY   HEPATITIS C ANTIBODY   HEP C VIRUS HOLD SPECIMEN     EKG Readings: (Independently Interpreted)   Rhythm: Normal Sinus Rhythm. Heart Rate: 4. Ectopy: No Ectopy. Conduction: Normal. ST Segments: Normal ST Segments. T Waves: Normal. Clinical Impression: Normal Sinus Rhythm   ECG Results              EKG 12-lead (In process)  Result time 07/13/23 16:22:58      In process by Interface, Lab In [facility] (07/13/23 16:22:58)                   Narrative:    Test Reason : R10.13,    Vent. Rate : 105 BPM     Atrial Rate : 105 BPM     P-R Int : 158 ms          QRS Dur : 080 ms      QT Int : 380 ms       P-R-T Axes : 045 037 053 degrees     QTc Int : 502 ms    Sinus tachycardia  Nonspecific T wave abnormality  Abnormal ECG  When compared with ECG of 16-OCT-2018 08:47,  Nonspecific T wave abnormality no longer evident in Anterior leads    Referred By: [email or web address]   SELF           Confirmed By:                                   Imaging Results              CT Renal Stone Study ABD Pelvis WO (Final result)  Result time 07/13/23 17:31:15      Final result by May Curiel MD (07/13/23 17:31:15)                   Impression:      No obstructive uropathy or sizable calcified urolith    Small and large bowel contains fluid without obstructive findings and minimal stranding in the root of the mesentery      Electronically signed by: May Chris  Veena  Date:    07/13/2023  Time:    17:31               Narrative:    EXAMINATION:  CT RENAL STONE STUDY ABD PELVIS WO    CLINICAL HISTORY:  Flank pain, kidney stone suspected;    TECHNIQUE:  Low dose axial images, sagittal and coronal reformations were obtained from the lung bases to the pubic symphysis.  Contrast was not administered.    COMPARISON:  None    FINDINGS:  Liver and spleen grossly normal size.  Gallbladder present    Pancreas unremarkable    Kidneys without hydronephrosis or sizable calcification.  No hydroureter.  Urinary bladder unremarkable    Small and large bowel contains fluid without obstructive findings    Pericecal surgical change    No fluid collection    Mild stranding at the root of the mesentery                                       X-Ray Chest AP Portable (Final result)  Result time 07/13/23 16:14:44      Final result by May Curiel MD (07/13/23 16:14:44)                   Impression:      No active or adverse finding      Electronically signed by: May Curiel  Date:    07/13/2023  Time:    16:14               Narrative:    EXAMINATION:  XR CHEST AP PORTABLE    CLINICAL HISTORY:  epigastric pain;    TECHNIQUE:  Single frontal portable view of the chest was performed.    COMPARISON:  05/28/2018    FINDINGS:  Lungs are unchanged with no new consolidation or sizable pleural effusion.  Mediastinal contour stable.                                       X-Ray Abdomen Flat And Erect (Final result)  Result time 07/13/23 16:16:29      Final result by May Curiel MD (07/13/23 16:16:29)                   Impression:      No air distended bowel, significant air-fluid levels or pneumoperitoneum.  Nonspecific scattered air-fluid levels possible mild ileus.      Electronically signed by: May Curiel  Date:    07/13/2023  Time:    16:16               Narrative:    EXAMINATION:  XR ABDOMEN FLAT AND ERECT    CLINICAL HISTORY:  Epigastric pain    TECHNIQUE:  Three-view exam  supine and erect    COMPARISON:  Comparison imaging from 2018                                       Medications   aluminum-magnesium hydroxide-simethicone 200-200-20 mg/5 mL suspension 30 mL (30 mLs Oral Not Given 7/13/23 1614)   ondansetron disintegrating tablet 4 mg (4 mg Oral Given 7/13/23 1619)                       All findings were reviewed with the patient/family in detail.  I have reviewed the details of the CT findings which did not appear to identify any source for bacterial infection.  Similarly the labs are fairly unremarkable aside from her white blood cell count elevation to 17,000. At present she has no tenderness to palpation and I have no real suspicion for pancreatitis or cholecystitis.  Similarly I feel we have effectively ruled out ACS in this setting.      I see no indication of an emergent process beyond that addressed during our encounter but have duly counseled the patient/family regarding the need for prompt follow-up as well as the indications that should prompt immediate return to the emergency room should new or worrisome developments occur.  The patient has additionally been provided with printed information regarding diagnosis as well as instructions regarding follow up and any medications intended to manage the patient's aforementioned conditions.  The patient/family communicates understanding of all this information and all remaining questions and concerns were addressed at this time.               Clinical Impression:   Final diagnoses:  [R10.13] Epigastric pain  [R11.2] Nausea and vomiting, unspecified vomiting type (Primary)        ED Disposition Condition    Discharge Stable          ED Prescriptions       Medication Sig Dispense Start Date End Date Auth. Provider    ondansetron (ZOFRAN-ODT) 4 MG TbDL  (Status: Discontinued) Take 1 tablet (4 mg total) by mouth every 6 (six) hours as needed (nausea). 12 tablet 7/13/2023 7/13/2023 Dion Hunter MD    ondansetron (ZOFRAN-ODT) 4  MG TbDL Take 1 tablet (4 mg total) by mouth every 6 (six) hours as needed (nausea). 12 tablet 7/13/2023 -- Dion Hunter MD          Follow-up Information       Follow up With Specialties Details Why Contact Info    Nate Barragan MD Internal Medicine Schedule an appointment as soon as possible for a visit  for reassessment [address] [place] [id number]  [phone]      [facility] - Emergency Dept Emergency Medicine Go to  As needed, If symptoms worsen [id number] [facility] [address]  [facility] [id number] [phone]             Dion Hunter MD  07/13/23 0835

## 2023-07-14 LAB
HCV AB SERPL QL IA: NEGATIVE
HIV 1+2 AB+HIV1 P24 AG SERPL QL IA: NEGATIVE

## 2023-07-19 PROBLEM — I10 PRIMARY HYPERTENSION: Status: ACTIVE | Noted: 2023-07-19

## 2023-07-19 RX ORDER — FAMOTIDINE 40 MG/1
TABLET, FILM COATED ORAL
COMMUNITY
Start: 2023-05-22

## 2023-07-19 NOTE — ASSESSMENT & PLAN NOTE
Lab Results   Component Value Date    HGBA1C 5.2 07/11/2023    HGBA1C 5.6 01/05/2023    .00 10/04/2022    CREATININE 0.9 07/13/2023      Discontinue Ozempic 1mg weekly and start Metformin 500mg BID with meals.  Follow ADA Diet. Avoid soda, simple sweets, and limit rice/pasta/breads/starches (no more than 45-50 grams per meal).  Maintain healthy weight with goal BMI <30.  Exercise 5 times per week for 30 minutes per day.  Stressed importance of daily foot exams.  Stressed importance of annual dilated eye exam.

## 2023-07-19 NOTE — PROGRESS NOTES
Patient ID: [id number]     Chief Complaint: Follow-up (ED F/U ), Nausea, and Emesis      HPI:     Rosalie Tatum is a 72 y.o. female here today for a follow up. Reviewed and discussed lab results. Presented to ED on 7/13 for abdominal cramping, nausea, vomiting, and diarrhea x 1-2 days. Symptoms have resolved. ED MD attributed to Ozempic which she has not taken in about a week and a half. Would like to discontinue Ozempic. HA1C very well controlled at 5.2. We discussed likelihood of viral etiology and low likelihood of Ozempic causing this episode of N/V but would like to switch over to Metformin. No other complaints today.     Past Medical History:   Diagnosis Date    Arthritis     Hiatal hernia     Hypertension     Primary hypertension 7/19/2023        Past Surgical History:   Procedure Laterality Date    COLON SURGERY      COLONOSCOPY  07/21/2020    EYE SURGERY  May2021        Social History     Tobacco Use    Smoking status: Never    Smokeless tobacco: Never   Substance and Sexual Activity    Alcohol use: No    Drug use: No    Sexual activity: Yes     Partners: Male     Birth control/protection: Post-menopausal        Current Outpatient Medications   Medication Instructions    albuterol 90 mcg/actuation inhaler 1-2 puffs, Inhalation, Every 6 hours PRN    carvediloL (COREG) 12.5 mg, Oral, 2 times daily    ergocalciferol (ERGOCALCIFEROL) 50,000 Units, Oral, Every 7 days    famotidine (PEPCID) 40 MG tablet No dose, route, or frequency recorded.    hydroCHLOROthiazide (HYDRODIURIL) 25 MG tablet TAKE 1 TABLET BY MOUTH EVERY DAY    Lactobacillus rhamnosus GG (CULTURELLE) 10 billion cell capsule 1 capsule, Oral, Daily    linaCLOtide (LINZESS) 72 mcg, Oral, Daily    meloxicam (MOBIC) 15 mg, Oral, Daily    metFORMIN (GLUCOPHAGE) 500 mg, Oral, 2 times daily with meals    multivitamin capsule 1 capsule, Oral, Daily    olmesartan (BENICAR) 40 mg, Oral, Daily    ondansetron (ZOFRAN-ODT) 4 mg, Oral, Every 6 hours PRN     "polyethylene glycol (GLYCOLAX) 17 g, Oral, Daily    vitamin E 200 Units, Oral, Daily    zinc gluconate 50 mg, Oral, Daily       Review of patient's allergies indicates:   Allergen Reactions    Codeine Nausea Only     Other reaction(s): Not Indicated    Codeine phosphate (bulk)         Patient Care Team:  Nate Barragan MD as PCP - General (Internal Medicine)  Torsten Robbins OD (Optometry)  Evangelina Faria LPN as Care Coordinator  Rodriguez Webster MD as Consulting Physician (Gastroenterology)  Alexus Strong MD as Consulting Physician (Obstetrics and Gynecology)  [facility]     Subjective:     Review of Systems    12 point review of systems conducted, negative except as stated in the history of present illness. See HPI for details.    Objective:     Visit Vitals  /72 (BP Location: Right arm, Patient Position: Sitting)   Pulse 81   Temp 98.1 °F (36.7 °C)   Resp 16   Ht 5' 3" (1.6 m)   Wt 83 kg (183 lb)   SpO2 98%   BMI 32.42 kg/m²       Physical Exam  Vitals and nursing note reviewed.   Constitutional:       General: She is not in acute distress.     Appearance: She is not ill-appearing.   HENT:      Head: Normocephalic and atraumatic.      Mouth/Throat:      Mouth: Mucous membranes are moist.      Pharynx: Oropharynx is clear.   Eyes:      General: No scleral icterus.     Extraocular Movements: Extraocular movements intact.      Conjunctiva/sclera: Conjunctivae normal.      Pupils: Pupils are equal, round, and reactive to light.   Neck:      Vascular: No carotid bruit.   Cardiovascular:      Rate and Rhythm: Normal rate and regular rhythm.      Heart sounds: No murmur heard.    No friction rub. No gallop.   Pulmonary:      Effort: Pulmonary effort is normal. No respiratory distress.      Breath sounds: Normal breath sounds. No wheezing, rhonchi or rales.   Abdominal:      General: Abdomen is flat. Bowel sounds are normal. There is no distension.      Palpations: Abdomen is soft. " There is no mass.      Tenderness: There is no abdominal tenderness.   Musculoskeletal:         General: Normal range of motion.      Cervical back: Normal range of motion and neck supple.   Skin:     General: Skin is warm and dry.   Neurological:      General: No focal deficit present.      Mental Status: She is alert.   Psychiatric:         Mood and Affect: Mood normal.       Labs Reviewed:     Chemistry:  Lab Results   Component Value Date     07/13/2023    K 3.9 07/13/2023    CHLORIDE 106 07/11/2023    BUN 13 07/13/2023    CREATININE 0.9 07/13/2023    EGFRNORACEVR >60.0 07/13/2023    GLUCOSE 96 07/11/2023    CALCIUM 9.6 07/13/2023    ALKPHOS 64 07/13/2023    LABPROT 7.0 07/11/2023    ALBUMIN 4.6 07/13/2023    BILIDIR 0.2 10/04/2022    IBILI 0.60 03/14/2022    AST 23 07/13/2023    ALT 21 07/13/2023    MG 2.2 05/28/2019    UHOJHXFY96DR 47.01 05/28/2019    TSH 1.100 11/09/2018        Lab Results   Component Value Date    HGBA1C 5.2 07/11/2023        Hematology:  Lab Results   Component Value Date    WBC 17.44 (H) 07/13/2023    HGB 13.0 07/13/2023    HCT 37.6 07/13/2023     07/13/2023       Lipid Panel:  Lab Results   Component Value Date    CHOL 201 (H) 10/04/2022    HDL 41 10/04/2022    .00 10/04/2022    TRIG 197 (H) 10/04/2022    TOTALCHOLEST 5 10/04/2022        Urine:  Lab Results   Component Value Date    APPEARANCEUA Hazy (A) 07/13/2023    PROTEINUA Trace (A) 07/13/2023    LEUKOCYTESUR Trace (A) 07/13/2023    RBCUA 1 07/13/2023    WBCUA 3 07/13/2023    BACTERIA Few (A) 07/13/2023    HYALINECASTS 1 07/13/2023        Assessment:       ICD-10-CM ICD-9-CM   1. Type 2 diabetes mellitus without complication, without long-term current use of insulin  E11.9 250.00   2. Primary hypertension  I10 401.9   3. Severe obesity (BMI 35.0-39.9) with comorbidity  E66.01 278.01   4. Leukocytosis, unspecified type  D72.829 288.60        Plan:     1. Type 2 diabetes mellitus without complication, without  long-term current use of insulin  Assessment & Plan:  Lab Results   Component Value Date    HGBA1C 5.2 07/11/2023    HGBA1C 5.6 01/05/2023    .00 10/04/2022    CREATININE 0.9 07/13/2023      Discontinue Ozempic 1mg weekly and start Metformin 500mg BID with meals.  Follow ADA Diet. Avoid soda, simple sweets, and limit rice/pasta/breads/starches (no more than 45-50 grams per meal).  Maintain healthy weight with goal BMI <30.  Exercise 5 times per week for 30 minutes per day.  Stressed importance of daily foot exams.  Stressed importance of annual dilated eye exam.            2. Primary hypertension  Assessment & Plan:  Well controlled.   Continue Olmesartan 40mg daily + Coreg 12.5mg BID + HCTZ 25mg daily   Low Sodium Diet (DASH Diet - Less than 2 grams of sodium per day).  Monitor blood pressure daily and log. Report consistent numbers greater than 140/90.  Maintain healthy weight with goal BMI <30. Exercise 30 minutes per day, 5 days per week.        3. Severe obesity (BMI 35.0-39.9) with comorbidity  Assessment & Plan:  Body mass index is 32.42 kg/m².  Goal BMI <30.  Exercise 5 times a week for 30 minutes per day.  Avoid soda, simple sugars, excessive rice, potatoes or bread. Limit fast foods and fried foods.  Choose complex carbs in moderation (example: green vegetables, beans, oatmeal). Eat plenty of fresh fruits and vegetables with lean meats daily.  Do not skip meals. Eat a balanced portion size.  Avoid fad diets. Consider permanent healthy life style changes.         4. Leukocytosis, unspecified type  Assessment & Plan:  Repeat CBC today    Orders:  -     CBC Auto Differential; Future; Expected date: 07/20/2023    Other orders  -     metFORMIN (GLUCOPHAGE) 500 MG tablet; Take 1 tablet (500 mg total) by mouth 2 (two) times daily with meals.  Dispense: 180 tablet; Refill: 3         Follow up in about 1 month (around 8/20/2023) for Medicare Wellness. In addition to their scheduled follow up, the patient has  also been instructed to follow up on as needed basis.     Future Appointments   Date Time Provider Department Center   8/30/2023 11:20 AM Nate Barragan MD [facility] [address] [id number]          YUNI Combs

## 2023-07-19 NOTE — ASSESSMENT & PLAN NOTE
Well controlled.   Continue Olmesartan 40mg daily + Coreg 12.5mg BID + HCTZ 25mg daily   Low Sodium Diet (DASH Diet - Less than 2 grams of sodium per day).  Monitor blood pressure daily and log. Report consistent numbers greater than 140/90.  Maintain healthy weight with goal BMI <30. Exercise 30 minutes per day, 5 days per week.

## 2023-07-20 ENCOUNTER — TELEPHONE (OUTPATIENT)
Dept: INTERNAL MEDICINE | Facility: CLINIC | Age: 73
End: 2023-07-20

## 2023-07-20 ENCOUNTER — OFFICE VISIT (OUTPATIENT)
Dept: INTERNAL MEDICINE | Facility: CLINIC | Age: 73
End: 2023-07-20
Payer: MEDICARE

## 2023-07-20 VITALS
SYSTOLIC BLOOD PRESSURE: 130 MMHG | WEIGHT: 183 LBS | HEART RATE: 81 BPM | RESPIRATION RATE: 16 BRPM | DIASTOLIC BLOOD PRESSURE: 72 MMHG | HEIGHT: 63 IN | TEMPERATURE: 98 F | OXYGEN SATURATION: 98 % | BODY MASS INDEX: 32.43 KG/M2

## 2023-07-20 DIAGNOSIS — E11.9 TYPE 2 DIABETES MELLITUS WITHOUT COMPLICATION, WITHOUT LONG-TERM CURRENT USE OF INSULIN: Primary | ICD-10-CM

## 2023-07-20 DIAGNOSIS — E66.01 SEVERE OBESITY (BMI 35.0-39.9) WITH COMORBIDITY: ICD-10-CM

## 2023-07-20 DIAGNOSIS — D72.829 LEUKOCYTOSIS, UNSPECIFIED TYPE: ICD-10-CM

## 2023-07-20 DIAGNOSIS — I10 PRIMARY HYPERTENSION: ICD-10-CM

## 2023-07-20 PROCEDURE — 99214 OFFICE O/P EST MOD 30 MIN: CPT | Mod: ,,, | Performed by: NURSE PRACTITIONER

## 2023-07-20 PROCEDURE — 99214 PR OFFICE/OUTPT VISIT, EST, LEVL IV, 30-39 MIN: ICD-10-PCS | Mod: ,,, | Performed by: NURSE PRACTITIONER

## 2023-07-20 PROCEDURE — 85025 COMPLETE CBC W/AUTO DIFF WBC: CPT | Performed by: NURSE PRACTITIONER

## 2023-07-20 RX ORDER — METFORMIN HYDROCHLORIDE 500 MG/1
500 TABLET ORAL 2 TIMES DAILY WITH MEALS
Qty: 180 TABLET | Refills: 3 | Status: SHIPPED | OUTPATIENT
Start: 2023-07-20 | End: 2024-02-29

## 2023-07-20 NOTE — ASSESSMENT & PLAN NOTE

## 2023-07-20 NOTE — TELEPHONE ENCOUNTER
----- Message from YUNI Mccarthy sent at 7/20/2023  3:36 PM CDT -----  All lab results within acceptable ranges.

## 2023-08-29 ENCOUNTER — TELEPHONE (OUTPATIENT)
Dept: INTERNAL MEDICINE | Facility: CLINIC | Age: 73
End: 2023-08-29
Payer: MEDICARE

## 2023-08-29 NOTE — TELEPHONE ENCOUNTER
----- Message from Felisha Graves sent at 8/29/2023 10:12 AM CDT -----  Regarding: labs  Type:  Needs Medical Advice    Who Called: pt  Symptoms (please be specific):    How long has patient had these symptoms:    Pharmacy name and phone #:    Would the patient rather a call back or a response via [facility]?   Best Call Back Number: [phone]  Additional Information: pt called about need to have labs put in for her to have done tomorrow morning. Please advise

## 2023-08-30 ENCOUNTER — OFFICE VISIT (OUTPATIENT)
Dept: INTERNAL MEDICINE | Facility: CLINIC | Age: 73
End: 2023-08-30
Payer: MEDICARE

## 2023-08-30 VITALS
RESPIRATION RATE: 16 BRPM | BODY MASS INDEX: 34.38 KG/M2 | DIASTOLIC BLOOD PRESSURE: 86 MMHG | WEIGHT: 194 LBS | HEIGHT: 63 IN | OXYGEN SATURATION: 99 % | TEMPERATURE: 98 F | SYSTOLIC BLOOD PRESSURE: 136 MMHG | HEART RATE: 84 BPM

## 2023-08-30 DIAGNOSIS — E78.00 PURE HYPERCHOLESTEROLEMIA: ICD-10-CM

## 2023-08-30 DIAGNOSIS — Z00.00 MEDICARE ANNUAL WELLNESS VISIT, SUBSEQUENT: Primary | ICD-10-CM

## 2023-08-30 DIAGNOSIS — G47.33 OBSTRUCTIVE SLEEP APNEA SYNDROME: ICD-10-CM

## 2023-08-30 DIAGNOSIS — Z78.0 POSTMENOPAUSAL STATE: ICD-10-CM

## 2023-08-30 DIAGNOSIS — I10 PRIMARY HYPERTENSION: ICD-10-CM

## 2023-08-30 DIAGNOSIS — Z12.31 SCREENING MAMMOGRAM FOR BREAST CANCER: ICD-10-CM

## 2023-08-30 DIAGNOSIS — E11.9 TYPE 2 DIABETES MELLITUS WITHOUT COMPLICATION, WITHOUT LONG-TERM CURRENT USE OF INSULIN: ICD-10-CM

## 2023-08-30 PROBLEM — E55.9 VITAMIN D DEFICIENCY: Status: ACTIVE | Noted: 2023-08-30

## 2023-08-30 PROBLEM — G47.30 SLEEP APNEA: Status: ACTIVE | Noted: 2022-10-06

## 2023-08-30 PROCEDURE — G0439 PR MEDICARE ANNUAL WELLNESS SUBSEQUENT VISIT: ICD-10-PCS | Mod: ,,, | Performed by: INTERNAL MEDICINE

## 2023-08-30 PROCEDURE — G0439 PPPS, SUBSEQ VISIT: HCPCS | Mod: ,,, | Performed by: INTERNAL MEDICINE

## 2023-08-30 RX ORDER — ROSUVASTATIN CALCIUM 10 MG/1
10 TABLET, COATED ORAL NIGHTLY
Qty: 90 TABLET | Refills: 3 | Status: SHIPPED | OUTPATIENT
Start: 2023-08-30 | End: 2024-08-29

## 2023-08-30 RX ORDER — SEMAGLUTIDE 0.68 MG/ML
0.5 INJECTION, SOLUTION SUBCUTANEOUS
Qty: 3 ML | Refills: 11 | Status: SHIPPED | OUTPATIENT
Start: 2023-08-30 | End: 2024-02-29

## 2023-08-30 NOTE — ASSESSMENT & PLAN NOTE
Resume Ozempic; we are going to up titrate again and I am going to keep her on the 0.5 mg  RTC 3 months for revisit

## 2023-08-30 NOTE — ASSESSMENT & PLAN NOTE
General health maintenance education given, labs reviewed.  Age-appropriate exams are currently up-to-date will see her back in 3 months.

## 2023-08-30 NOTE — PROGRESS NOTES
Patient ID: [id number]     Chief Complaint: Medicare AWV      HPI:     Rosalie Tatum is a 72 y.o. female here today for a Medicare Wellness.     Single, no children.  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  Colonoscopy in , 5 year recall  Dr. Beard-GYN, up-to-date with Pap and mammogram  Dr. Perez-podiatry  Dr. Uribe-orthopedic, receives LESI for L4-L5 radiculopathy  Dr. Augustin-orthopedic, injections for trigger finger  Has a brother with hx of colon cancer, most recently he got an automobile accident and is in the hospital in [place]  Mother  at 76 from sepsis, history of diabetes.  Father  at age 91 with history of pancreatic cancer.  Has sleep apnea and is compliant with CPAP machine.  She recently ran out of her Ozempic and was out of it for almost a month and then when she got it back in she went right back to the 1 mg dose and presented to the ER with nausea and vomiting.  So she has been off of it her fasting sugars are above 120 I did advise that we need to go back on it and we could start her back off on the 0.25 and keep her on the 0.5 mg and she is amenable to that    Opioid Screening: Patient medication list reviewed, patient is not taking prescription opioids. Patient is not using additional opioids than prescribed. Patient is at low risk of substance abuse based on this opioid use history.       ----------------------------  Arthritis  Hiatal hernia  Hypertension  Primary hypertension     Past Surgical History:   Procedure Laterality Date    COLON SURGERY      COLONOSCOPY  2020    EYE SURGERY  2021       Review of patient's allergies indicates:   Allergen Reactions    Codeine Nausea Only     Other reaction(s): Not Indicated    Codeine phosphate (bulk)        Outpatient Medications Marked as Taking for the 23 encounter (Office Visit) with  Nate Barragan MD   Medication Sig Dispense Refill    carvediloL (COREG) 12.5 MG tablet Take 12.5 mg by mouth 2 (two) times daily.      ergocalciferol (ERGOCALCIFEROL) 50,000 unit Cap Take 50,000 Units by mouth every 7 days.      famotidine (PEPCID) 40 MG tablet       hydroCHLOROthiazide (HYDRODIURIL) 25 MG tablet TAKE 1 TABLET BY MOUTH EVERY DAY 90 tablet 4    linaCLOtide (LINZESS) 72 mcg Cap capsule Take 72 mcg by mouth once daily.      meloxicam (MOBIC) 15 MG tablet Take 1 tablet (15 mg total) by mouth once daily. 90 tablet 3    metFORMIN (GLUCOPHAGE) 500 MG tablet Take 1 tablet (500 mg total) by mouth 2 (two) times daily with meals. 180 tablet 3    multivitamin capsule Take 1 capsule by mouth once daily.      olmesartan (BENICAR) 40 MG tablet Take 40 mg by mouth once daily.      vitamin E 200 UNIT capsule Take 200 Units by mouth once daily.         Social History     Socioeconomic History    Marital status:    Tobacco Use    Smoking status: Never    Smokeless tobacco: Never   Substance and Sexual Activity    Alcohol use: No    Drug use: No    Sexual activity: Yes     Partners: Male     Birth control/protection: Post-menopausal     Social Determinants of Health     Financial Resource Strain: Low Risk  (7/20/2023)    Overall Financial Resource Strain (CARDIA)     Difficulty of Paying Living Expenses: Not very hard   Food Insecurity: No Food Insecurity (7/20/2023)    Hunger Vital Sign     Worried About Running Out of Food in the Last Year: Never true     Ran Out of Food in the Last Year: Never true   Transportation Needs: No Transportation Needs (7/20/2023)    PRAPARE - Transportation     Lack of Transportation (Medical): No     Lack of Transportation (Non-Medical): No   Physical Activity: Insufficiently Active (7/20/2023)    Exercise Vital Sign     Days of Exercise per Week: 3 days     Minutes of Exercise per Session: 30 min   Stress: No Stress Concern Present (7/20/2023)    Citizen of the [place] of  Occupational Health - Occupational Stress Questionnaire     Feeling of Stress : Not at all   Social Connections: Socially Integrated (7/20/2023)    Social Connection and Isolation Panel [NHANES]     Frequency of Communication with Friends and Family: Three times a week     Frequency of Social Gatherings with Friends and Family: Three times a week     Attends Religion Services: More than 4 times per year     Active Member of Clubs or Organizations: Yes     Attends Club or Organization Meetings: More than 4 times per year     Marital Status:    Housing Stability: Unknown (7/20/2023)    Housing Stability Vital Sign     Unable to Pay for Housing in the Last Year: No     Unstable Housing in the Last Year: No        Family History   Problem Relation Age of Onset    Diabetes Mother     Cancer Father         Patient Care Team:  Nate Barragan MD as PCP - General (Internal Medicine)  Torsten Robbins OD (Optometry)  Evangelina Faria LPN as Care Coordinator  Rodriguez Webster MD as Consulting Physician (Gastroenterology)  Alexus Strong MD as Consulting Physician (Obstetrics and Gynecology)  Nupur Cordero       Subjective:     ROS  Constitutional: No fever, no chills, no night sweats, no changes in weight, no changes in appetite.  Eye: No blurring of vision, no double vision, no conjunctival injection, no acute vision loss  ENMT: No trauma, No sore throat, no rhinorrhea, no tinnitus, no headache, no vertigo, no ear pain or discharge  Respiratory: No cough, no sputum production, no shortness of breath, no hemoptysis, no wheezing.  Cardiovascular: No chest pain, no RECINOS, no PND, no orthopnea, no edema, no palpitations.  Gastrointestinal: No abdominal pain, nausea, no vomiting, no changes in frequency or consistency of stools, no diarrhea, no constipation, no BRBPR.  Genitourinary: No dysuria, no hematuria, no foul-smelling urine, no straining to urinate, no increase in frequency  Heme/Lymph:  No easy bruising/ bleeding, no swollen or painful glands.  Endocrine: No polyuria, no polydipsia, no polyphagia, no heat or cold intolerance.  Musculoskeletal: No muscle pain, no muscle weakness, no joint pain, no difficulties on reference to range of motion.  Integumentary: No rash, no pruritis.  Neurologic: No sensory deficit, no motor deficit, no headache, no neck rigidity, no paresthesias, no syncope.  Psychiatric: no mood changes, no anxiety, no depression, no tension, no memory defecits  All Other ROS: Negative with exception of what is documented in the history of present illness     Patient Reported Health Risk Assessment  What [age]  Are you male or female?: Female  During the past four weeks, how much have you been bothered by emotional problems such as feeling anxious, depressed, irritable, sad, or downhearted and blue?: Not at all  During the past five weeks, has your physical and/or emotional health limited your social activities with family, friends, neighbors, or groups?: Not at all  During the past four weeks, how much bodily pain have you generally had?: Very mild pain  During the past four weeks, was someone available to help if you needed and wanted help?: Yes, quite a bit  During the past four weeks, what was the hardest physical activity you could do for at least two minutes?: Very heavy  Can you get to places out of walking distance without help?  (For example, can you travel alone on buses or taxis, or drive your own car?): Yes  Can you go shopping for groceries or clothes without someone's help?: Yes  Can you prepare your own meals?: Yes  Can you do your own housework without help?: Yes  Because of any health problems, do you need the help of another person with your personal care needs such as eating, bathing, dressing, or getting around the house?: No  Can you handle your own money without help?: Yes  During the past four weeks, how would you rate your health in general?: Very  "good  How have things been going for you during the past four weeks?: Pretty well  Are you having difficulties driving your car?: No  Do you always fasten your seat belt when you are in a car?: Yes, usually  How often in the past four weeks have you been bothered by falling or dizzy when standing up?: Never  How often in the past four weeks have you been bothered by sexual problems?: Never  How often in the past four weeks have you been bothered by trouble eating well?: Never  How often in the past four weeks have you been bothered by teeth or denture problems?: Never  How often in the past four weeks have you been bothered with problems using the telephone?: Never  How often in the past four weeks have you been bothered by tiredness or fatigue?: Never  Have you fallen two or more times in the past year?: No  Are you afraid of falling?: No  Are you a smoker?: No  During the past four weeks, how many drinks of wine, beer, or other alcoholic beverages did you have?: One drink or less per week  Do you exercise for about 20 minutes three or more days a week?: Yes, some of the time  Have you been given any information to help you with hazards in your house that might hurt you?: Yes  Have you been given any information to help you with keeping track of your medications?: Yes  How often do you have trouble taking medicines the way you've been told to take them?: I always take them as prescribed  How confident are you that you can control and manage most of your health problems?: Very confident  What is your race? (Check all that apply.):     Objective:     /86 (BP Location: Left arm, Patient Position: Sitting, BP Method: Medium (Manual))   Pulse 84   Temp 97.5 °F (36.4 °C) (Temporal)   Resp 16   Ht 5' 3" (1.6 m)   Wt 88 kg (194 lb)   SpO2 99%   BMI 34.37 kg/m²     Physical Exam  General : Alert and oriented, No acute distress, afebrile.  Eye : PERRLA. EOMI. Normal conjunctiva, Sclerae are " nonicteric. No conjunctival injection, no pallor.  HEENT : Normocephalic/ atraumatic, Normal hearing, Oral mucosa is moist.  Respiratory : Respirations are non-labored and clear to auscultation bilaterally. Symmetrical air entry bilaterally, no crackles, no wheezes, no rhonchi. No cyanosis, no clubbing.  Cardiovascular : Normal rate, Regular rhythm. No murmurs, rubs, or gallops. Pulses are 2+ throughout. No JVD. No Edema.  Gastrointestinal : Soft, nontender, non-distended, bowel sounds are present in all quadrants, no organomegaly, no guarding, no rebound.  Musculoskeletal : Normal range of motion throughout. No muscle tenderness.  Integumentary : Warm, moist, intact.  Neurologic : Alert, Oriented  Psychiatric : Cooperative, Appropriate mood & affect.          No data to display                  8/30/2023    11:20 AM 7/20/2023     9:20 AM 1/9/2023     2:40 PM   Fall Risk Assessment - Outpatient   Mobility Status Ambulatory Ambulatory Ambulatory   Number of falls 0 0 0   Identified as fall risk False False False           Depression Screening  Over the past two weeks, has the patient felt down, depressed, or hopeless?: No  Over the past two weeks, has the patient felt little interest or pleasure in doing things?: No  Functional Ability/Safety Screening  Was the patient's timed Up & Go test unsteady or longer than 30 seconds?: No  Does the patient need help with phone, transportation, shopping, preparing meals, housework, laundry, meds, or managing money?: No  Does the patient's home have rugs in the hallway, lack grab bars in the bathroom, lack handrails on the stairs or have poor lighting?: No  Have you noticed any hearing difficulties?: No  Cognitive Function (Assessed through direct observation with due consideration of information obtained by way of patient reports and/or concerns raised by family, friends, caretakers, or others)    Does the patient repeat questions/statements in the same day?: No  Does the  patient have trouble remembering the date, year, and time?: No  Does the patient have difficulty managing finances?: No  Does the patient have a decreased sense of direction?: No  Assessment/Plan:     1. Medicare annual wellness visit, subsequent  Assessment & Plan:  General health maintenance education given, labs reviewed.  Age-appropriate exams are currently up-to-date will see her back in 3 months.      2. Postmenopausal state  -     DXA Bone Density Axial Skeleton 1 or more sites; Future; Expected date: 08/30/2023    3. Screening mammogram for breast cancer  -     Mammo Digital Screening Bilat; Future; Expected date: 08/30/2023    4. Type 2 diabetes mellitus without complication, without long-term current use of insulin  Assessment & Plan:  Resume Ozempic; we are going to up titrate again and I am going to keep her on the 0.5 mg  RTC 3 months for revisit    Orders:  -     Hemoglobin A1C; Future; Expected date: 11/30/2023  -     Comprehensive Metabolic Panel; Future; Expected date: 11/30/2023  -     Lipid Panel; Future; Expected date: 11/30/2023  -     Microalbumin/Creatinine Ratio, Urine; Future; Expected date: 11/30/2023    5. Obstructive sleep apnea syndrome  Assessment & Plan:  Compliant with equipment      6. Primary hypertension  Assessment & Plan:  Well controlled.   Continue current med management  Low Sodium Diet (DASH Diet - Less than 2 grams of sodium per day).  Monitor blood pressure daily and log. Report consistent numbers greater than 140/90.  Maintain healthy weight with goal BMI <30. Exercise 30 minutes per day, 5 days per week.  Smoking cessation encouraged to aid in BP reduction.      7. Pure hypercholesterolemia  Assessment & Plan:  Start Crestor 10 mg at bedtime  Lipid recheck in 3 months, lab orders placed    Orders:  -     Hemoglobin A1C; Future; Expected date: 11/30/2023  -     Comprehensive Metabolic Panel; Future; Expected date: 11/30/2023  -     Lipid Panel; Future; Expected date:  11/30/2023  -     Microalbumin/Creatinine Ratio, Urine; Future; Expected date: 11/30/2023    Other orders  -     semaglutide (OZEMPIC) 0.25 mg or 0.5 mg (2 mg/3 mL) pen injector; Inject 0.5 mg into the skin every 7 days.  Dispense: 3 mL; Refill: 11  -     rosuvastatin (CRESTOR) 10 MG tablet; Take 1 tablet (10 mg total) by mouth every evening.  Dispense: 90 tablet; Refill: 3           Medicare Annual Wellness and Personalized Prevention Plan:   Fall Risk + Home Safety + Hearing Impairment + Depression Screen + Opioid and Substance Abuse Screening + Cognitive Impairment Screen + Health Risk Assessment all reviewed.     Health Maintenance Topics with due status: Not Due       Topic Last Completion Date    Colorectal Cancer Screening 07/21/2020    Lipid Panel 10/04/2022    Influenza Vaccine 10/06/2022    Eye Exam 05/09/2023    Hemoglobin A1c 07/11/2023    Low Dose Statin 08/30/2023      The patient's Health Maintenance was reviewed and the following appears to be due at this time:   Health Maintenance Due   Topic Date Due    Diabetes Urine Screening  Never done    Foot Exam  Never done    TETANUS VACCINE  Never done    Shingles Vaccine (2 of 3) 01/18/2017    COVID-19 Vaccine (7 - Pfizer series) 04/28/2023    DEXA Scan  06/23/2023    Mammogram  10/25/2023       Advance Care Planning   I attest to discussing Advance Care Planning with patient and/or family member.  Education was provided including the importance of the Health Care Power of , Advance Directives, and/or [place] documentation.  The patient expressed understanding to the importance of this information and discussion.         Follow up in about 3 months (around 11/30/2023) for DIABETIC REVISIT, with labs prior to visit. In addition to their scheduled follow up, the patient has also been instructed to follow up on as needed basis.

## 2023-09-20 ENCOUNTER — TELEPHONE (OUTPATIENT)
Dept: INTERNAL MEDICINE | Facility: CLINIC | Age: 73
End: 2023-09-20
Payer: MEDICARE

## 2023-09-20 NOTE — TELEPHONE ENCOUNTER
----- Message from [facility] sent at 9/20/2023  3:17 PM CDT -----  .Type:  Needs Medical Advice    Who Called:  Cover my meds ( Maria C)      Would the patient rather a call back? Yes    Best Call Back Number: [phone] Ref key # [id number]    Additional Information:  this medication needs a prior authorization semaglutide (OZEMPIC) 0.25 mg or 0.5 mg (2 mg/3 mL) pen injector

## 2023-10-25 ENCOUNTER — TELEPHONE (OUTPATIENT)
Dept: INTERNAL MEDICINE | Facility: CLINIC | Age: 73
End: 2023-10-25
Payer: MEDICARE

## 2023-10-25 NOTE — TELEPHONE ENCOUNTER
----- Message from Elda Lee sent at 10/25/2023 12:57 PM CDT -----  Regarding: mammo  Type:  Mammogram    Caller is requesting to schedule their annual mammogram appointment.  Order is not listed in EPIC.  Please enter order and contact patient to schedule.    Name of Caller:nan     Where would they like the mammogram performed?[facility]  [phone]    Would the patient rather a call back or a response via [facility]?     Best Call Back Number:    Additional Information: Appt is tomorrow at the [facility] she wants orders          What Type Of Note Output Would You Prefer (Optional)?: Standard Output How Severe Is Your Skin Lesion?: mild Has Your Skin Lesion Been Treated?: not been treated Is This A New Presentation, Or A Follow-Up?: Growths

## 2023-11-14 ENCOUNTER — TELEPHONE (OUTPATIENT)
Dept: INTERNAL MEDICINE | Facility: CLINIC | Age: 73
End: 2023-11-14
Payer: MEDICARE

## 2023-11-14 NOTE — TELEPHONE ENCOUNTER
----- Message from Rodriguez Leon sent at 11/14/2023  1:22 PM CST -----  .Type:  Needs Medical Advice    Who Called: Rosalie  Symptoms (please be specific):    How long has patient had these symptoms:    Pharmacy name and phone #:    Would the patient rather a call back or a response via [facility]?   Best Call Back Number: [phone]  Additional Information: Requested a call back from nurse re: samples of ozempic possible.

## 2023-11-14 NOTE — TELEPHONE ENCOUNTER
Spoke to pt and she stated that she has not picked up RX from [facility], but remembered that she had gotten a sample at her last appointment. She is due for her injection on Friday 11/17/23. Please advise if okay to give pt a sample. I did recommend to pt that she call [facility] to verify that they have the prescription, and that they are able to fill it.

## 2023-11-15 NOTE — TELEPHONE ENCOUNTER
Spoke to pt and she is going to get a sample of Ozempic 0.5 mg dose on 11/16/23. She also stated that the pharmacy has to order it, but they should be able to get it by Friday.

## 2023-11-15 NOTE — TELEPHONE ENCOUNTER
"Per , "Yea and what's the deal with the pharamcy  Is it not on her forumlary or are they just out of stock?"    "

## 2023-12-04 PROBLEM — Z00.00 MEDICARE ANNUAL WELLNESS VISIT, SUBSEQUENT: Status: RESOLVED | Noted: 2023-08-30 | Resolved: 2023-12-04

## 2024-01-10 DIAGNOSIS — R52 GENERALIZED PAIN: ICD-10-CM

## 2024-01-10 RX ORDER — MELOXICAM 15 MG/1
15 TABLET ORAL
Qty: 90 TABLET | Refills: 3 | Status: SHIPPED | OUTPATIENT
Start: 2024-01-10

## 2024-02-14 ENCOUNTER — TELEPHONE (OUTPATIENT)
Dept: INTERNAL MEDICINE | Facility: CLINIC | Age: 74
End: 2024-02-14
Payer: MEDICARE

## 2024-02-14 DIAGNOSIS — E11.9 TYPE 2 DIABETES MELLITUS WITHOUT COMPLICATION, WITHOUT LONG-TERM CURRENT USE OF INSULIN: Primary | ICD-10-CM

## 2024-02-14 NOTE — TELEPHONE ENCOUNTER
----- Message from Charlie Hernandez MA sent at 2/14/2024  8:22 AM CST -----  Regarding: PV 2/29/24 @ 9:40 Dr. Nelson  1. Are there any outstanding tasks in the patient's chart? Yes, fasting labs    2. Is there any documentation in the chart? No    3.Has patient been seen in an ER, Urgent care clinic, or been admitted since last visit?  If yes, When, where, and why    4. Has patient seen any other healthcare providers since last visit?  If yes, when, where, and why    5. Has patient had any bloodwork or XR done since last visit?    6. Is patient signed up for patient portal?

## 2024-02-26 ENCOUNTER — LAB REQUISITION (OUTPATIENT)
Dept: LAB | Facility: HOSPITAL | Age: 74
End: 2024-02-26
Payer: MEDICARE

## 2024-02-26 ENCOUNTER — LAB VISIT (OUTPATIENT)
Dept: LAB | Facility: HOSPITAL | Age: 74
End: 2024-02-26
Attending: INTERNAL MEDICINE
Payer: MEDICARE

## 2024-02-26 DIAGNOSIS — E11.9 TYPE 2 DIABETES MELLITUS WITHOUT COMPLICATIONS: ICD-10-CM

## 2024-02-26 DIAGNOSIS — E11.9 TYPE 2 DIABETES MELLITUS WITHOUT COMPLICATION, WITHOUT LONG-TERM CURRENT USE OF INSULIN: ICD-10-CM

## 2024-02-26 LAB
ALBUMIN SERPL-MCNC: 4.1 G/DL (ref 3.4–4.8)
ALBUMIN/GLOB SERPL: 1.5 RATIO (ref 1.1–2)
ALP SERPL-CCNC: 60 UNIT/L (ref 40–150)
ALT SERPL-CCNC: 21 UNIT/L (ref 0–55)
APPEARANCE UR: ABNORMAL
AST SERPL-CCNC: 21 UNIT/L (ref 5–34)
BACTERIA #/AREA URNS AUTO: ABNORMAL /HPF
BILIRUB SERPL-MCNC: 0.8 MG/DL
BILIRUB UR QL STRIP.AUTO: NEGATIVE
BUN SERPL-MCNC: 13.6 MG/DL (ref 9.8–20.1)
CALCIUM SERPL-MCNC: 9.5 MG/DL (ref 8.4–10.2)
CHLORIDE SERPL-SCNC: 107 MMOL/L (ref 98–107)
CHOLEST SERPL-MCNC: 135 MG/DL
CHOLEST/HDLC SERPL: 3 {RATIO} (ref 0–5)
CO2 SERPL-SCNC: 29 MMOL/L (ref 23–31)
COLOR UR AUTO: ABNORMAL
CREAT SERPL-MCNC: 0.79 MG/DL (ref 0.55–1.02)
CREAT UR-MCNC: 87.5 MG/DL (ref 45–106)
EST. AVERAGE GLUCOSE BLD GHB EST-MCNC: 111.2 MG/DL
GFR SERPLBLD CREATININE-BSD FMLA CKD-EPI: >60 MLS/MIN/1.73/M2
GLOBULIN SER-MCNC: 2.8 GM/DL (ref 2.4–3.5)
GLUCOSE SERPL-MCNC: 102 MG/DL (ref 82–115)
GLUCOSE UR QL STRIP.AUTO: NORMAL
HBA1C MFR BLD: 5.5 %
HDLC SERPL-MCNC: 46 MG/DL (ref 35–60)
KETONES UR QL STRIP.AUTO: NEGATIVE
LDLC SERPL CALC-MCNC: 60 MG/DL (ref 50–140)
LEUKOCYTE ESTERASE UR QL STRIP.AUTO: NEGATIVE
MICROALBUMIN UR-MCNC: 8.3 UG/ML
MICROALBUMIN/CREAT RATIO PNL UR: 9.5 MG/GM CR (ref 0–30)
MUCOUS THREADS URNS QL MICRO: ABNORMAL /LPF
NITRITE UR QL STRIP.AUTO: NEGATIVE
PH UR STRIP.AUTO: 6 [PH]
POTASSIUM SERPL-SCNC: 3.7 MMOL/L (ref 3.5–5.1)
PROT SERPL-MCNC: 6.9 GM/DL (ref 5.8–7.6)
PROT UR QL STRIP.AUTO: NEGATIVE
RBC #/AREA URNS AUTO: ABNORMAL /HPF
RBC UR QL AUTO: ABNORMAL
SODIUM SERPL-SCNC: 141 MMOL/L (ref 136–145)
SP GR UR STRIP.AUTO: 1.01 (ref 1–1.03)
SQUAMOUS #/AREA URNS LPF: ABNORMAL /HPF
TRIGL SERPL-MCNC: 143 MG/DL (ref 37–140)
UROBILINOGEN UR STRIP-ACNC: NORMAL
VLDLC SERPL CALC-MCNC: 29 MG/DL
WBC #/AREA URNS AUTO: ABNORMAL /HPF

## 2024-02-26 PROCEDURE — 81001 URINALYSIS AUTO W/SCOPE: CPT | Performed by: INTERNAL MEDICINE

## 2024-02-26 PROCEDURE — 80061 LIPID PANEL: CPT

## 2024-02-26 PROCEDURE — 83036 HEMOGLOBIN GLYCOSYLATED A1C: CPT

## 2024-02-26 PROCEDURE — 80053 COMPREHEN METABOLIC PANEL: CPT

## 2024-02-26 PROCEDURE — 82043 UR ALBUMIN QUANTITATIVE: CPT | Performed by: INTERNAL MEDICINE

## 2024-02-26 PROCEDURE — 36415 COLL VENOUS BLD VENIPUNCTURE: CPT

## 2024-02-29 ENCOUNTER — OFFICE VISIT (OUTPATIENT)
Dept: INTERNAL MEDICINE | Facility: CLINIC | Age: 74
End: 2024-02-29
Payer: MEDICARE

## 2024-02-29 VITALS
RESPIRATION RATE: 16 BRPM | TEMPERATURE: 98 F | OXYGEN SATURATION: 97 % | HEIGHT: 63 IN | HEART RATE: 110 BPM | SYSTOLIC BLOOD PRESSURE: 144 MMHG | DIASTOLIC BLOOD PRESSURE: 82 MMHG | BODY MASS INDEX: 35.26 KG/M2 | WEIGHT: 199 LBS

## 2024-02-29 DIAGNOSIS — I10 PRIMARY HYPERTENSION: ICD-10-CM

## 2024-02-29 DIAGNOSIS — E66.01 SEVERE OBESITY (BMI 35.0-39.9) WITH COMORBIDITY: Primary | ICD-10-CM

## 2024-02-29 DIAGNOSIS — D22.9 NUMEROUS MOLES: ICD-10-CM

## 2024-02-29 DIAGNOSIS — E11.9 TYPE 2 DIABETES MELLITUS WITHOUT COMPLICATION, WITHOUT LONG-TERM CURRENT USE OF INSULIN: ICD-10-CM

## 2024-02-29 PROCEDURE — 99214 OFFICE O/P EST MOD 30 MIN: CPT | Mod: ,,, | Performed by: INTERNAL MEDICINE

## 2024-02-29 RX ORDER — OLMESARTAN MEDOXOMIL AND HYDROCHLOROTHIAZIDE 40/25 40; 25 MG/1; MG/1
1 TABLET ORAL DAILY
Qty: 90 TABLET | Refills: 3 | Status: SHIPPED | OUTPATIENT
Start: 2024-02-29 | End: 2025-02-28

## 2024-02-29 RX ORDER — NYSTATIN 100000 [USP'U]/G
POWDER TOPICAL 4 TIMES DAILY
Qty: 60 G | Refills: 1 | Status: SHIPPED | OUTPATIENT
Start: 2024-02-29

## 2024-02-29 RX ORDER — TIRZEPATIDE 2.5 MG/.5ML
2.5 INJECTION, SOLUTION SUBCUTANEOUS
Qty: 4 PEN | Refills: 6 | Status: SHIPPED | OUTPATIENT
Start: 2024-02-29 | End: 2024-06-05 | Stop reason: SDUPTHER

## 2024-02-29 RX ORDER — NYSTATIN 100000 U/G
CREAM TOPICAL 3 TIMES DAILY
COMMUNITY
Start: 2023-12-12 | End: 2024-02-29

## 2024-02-29 NOTE — PROGRESS NOTES
Internal Medicine    Patient ID: [id number]     Chief Complaint: Diabetes (6 month f/u)      HPI:     Rosalie Tatum is a 73 y.o. female here today for a follow up.   She reports bloating and fullness with her Ozempic only able to tolerate the 0.25 mg dose advised her to continue her current dosing of Ozempic until she runs out and then we can switch her to Mounjaro.  Her blood pressure is high and she has not been taking her HCTZ she is unsure why.  Labs reviewed with patient A1c at 5.5 fasting sugar at 102  She has large pendulous breasts and complains of rash underneath; she has been using nystatin cream and when she uses it it does improve.  She does not wear support  Past Medical History:   Diagnosis Date    Arthritis     Hiatal hernia     Hypertension     Primary hypertension 7/19/2023        Past Surgical History:   Procedure Laterality Date    COLON SURGERY      COLONOSCOPY  07/21/2020    EYE SURGERY  May2021        Social History     Tobacco Use    Smoking status: Never    Smokeless tobacco: Never   Substance and Sexual Activity    Alcohol use: No    Drug use: No    Sexual activity: Yes     Partners: Male     Birth control/protection: Post-menopausal        Current Outpatient Medications   Medication Instructions    carvediloL (COREG) 12.5 mg, Oral, 2 times daily    ergocalciferol (ERGOCALCIFEROL) 50,000 Units, Oral, Every 7 days    famotidine (PEPCID) 40 MG tablet No dose, route, or frequency recorded.    linaCLOtide (LINZESS) 145 mcg, Oral, Daily    meloxicam (MOBIC) 15 mg, Oral    MOUNJARO 2.5 mg, Subcutaneous, Every 7 days    multivitamin capsule 1 capsule, Oral, Daily    nystatin (MYCOSTATIN) powder Topical (Top), 4 times daily    olmesartan-hydrochlorothiazide (BENICAR HCT) 40-25 mg per tablet 1 tablet, Oral, Daily    rosuvastatin (CRESTOR) 10 mg, Oral, Nightly    vitamin E 200 Units, Oral, Daily       Review of patient's allergies indicates:   Allergen Reactions    Codeine Nausea Only     Other  "reaction(s): Not Indicated    Codeine phosphate (bulk)         Patient Care Team:  Nate Barragan MD as PCP - General (Internal Medicine)  Torsten Robbins OD (Optometry)  Evangelina Faria LPN as Care Coordinator  Rodriguez Webster MD as Consulting Physician (Gastroenterology)  Alexus Strong MD as Consulting Physician (Obstetrics and Gynecology)  Gil, Richard Myers MD as Consulting Physician (Cardiology)  Tahir Serrano MD as Consulting Physician (General Practice)     Subjective:     Review of Systems    12 point review of systems conducted, negative except as stated in the history of present illness. See HPI for details.    Objective:     Visit Vitals  BP (!) 144/82 (BP Location: Left arm, Patient Position: Sitting, BP Method: Medium (Manual))   Pulse 110   Temp 97.8 °F (36.6 °C) (Temporal)   Resp 16   Ht 5' 3" (1.6 m)   Wt 90.3 kg (199 lb)   SpO2 97%   BMI 35.25 kg/m²       Physical Exam  General : Alert and oriented, No acute distress, afebrile. Obese  Eye : PERRLA. EOMI. Normal conjunctiva, Sclerae are nonicteric. No conjunctival injection, no pallor.  HEENT : Normocephalic/ atraumatic, Normal hearing, Oral mucosa is moist.  Respiratory : Respirations are non-labored and clear to auscultation bilaterally. Symmetrical air entry bilaterally, no crackles, no wheezes, no rhonchi. No cyanosis, no clubbing.  Cardiovascular : Normal rate, Regular rhythm. No murmurs, rubs, or gallops. Pulses are 2+ throughout. No JVD. No Edema.  Gastrointestinal : Soft, nontender, non-distended, bowel sounds are present in all quadrants, no organomegaly, no guarding, no rebound.  Musculoskeletal : Normal range of motion throughout. No muscle tenderness.  Integumentary : Warm, moist, intact.  Rash noted underneath both breasts into the folds of the abdomen  Neurologic : Alert, Oriented,no FND  Psychiatric : Cooperative, Appropriate mood & affect.   Labs Reviewed:     Chemistry:  Lab Results "   Component Value Date     02/26/2024    K 3.7 02/26/2024    CHLORIDE 107 02/26/2024    BUN 13.6 02/26/2024    CREATININE 0.79 02/26/2024    EGFRNORACEVR >60 02/26/2024    GLUCOSE 102 02/26/2024    CALCIUM 9.5 02/26/2024    ALKPHOS 60 02/26/2024    LABPROT 6.9 02/26/2024    ALBUMIN 4.1 02/26/2024    BILIDIR 0.2 10/04/2022    IBILI 0.60 03/14/2022    AST 21 02/26/2024    ALT 21 02/26/2024    MG 2.2 05/28/2019    NCSFZKIB58YB 47.01 05/28/2019    TSH 1.100 11/09/2018        Lab Results   Component Value Date    HGBA1C 5.5 02/26/2024        Hematology:  Lab Results   Component Value Date    WBC 7.10 07/20/2023    HGB 12.5 07/20/2023    HCT 35.7 (L) 07/20/2023     07/20/2023       Lipid Panel:  Lab Results   Component Value Date    CHOL 135 02/26/2024    HDL 46 02/26/2024    LDL 60.00 02/26/2024    TRIG 143 (H) 02/26/2024    TOTALCHOLEST 3 02/26/2024        Urine:  Lab Results   Component Value Date    COLORUA Light-Yellow 02/26/2024    APPEARANCEUA Turbid (A) 02/26/2024    SGUA 1.012 02/26/2024    PHUA 6.0 02/26/2024    PROTEINUA Negative 02/26/2024    GLUCOSEUA Normal 02/26/2024    KETONESUA Negative 02/26/2024    BLOODUA Trace (A) 02/26/2024    NITRITESUA Negative 02/26/2024    LEUKOCYTESUR Negative 02/26/2024    RBCUA 0-5 02/26/2024    WBCUA 0-5 02/26/2024    BACTERIA Few (A) 02/26/2024    SQEPUA Occasional (A) 02/26/2024    HYALINECASTS 1 07/13/2023    CREATRANDUR 87.5 02/26/2024        Assessment:       ICD-10-CM ICD-9-CM   1. Severe obesity (BMI 35.0-39.9) with comorbidity  E66.01 278.01   2. Type 2 diabetes mellitus without complication, without long-term current use of insulin  E11.9 250.00   3. Primary hypertension  I10 401.9        Plan:     1. Severe obesity (BMI 35.0-39.9) with comorbidity    2. Type 2 diabetes mellitus without complication, without long-term current use of insulin  Assessment & Plan:  DC Ozempic secondary to GI side effects, start Mounjaro patient advised to decrease intake of  carbonated beverages and to not eat past satiety  Lab Results   Component Value Date    HGBA1C 5.5 02/26/2024         3. Primary hypertension  Assessment & Plan:  For whatever reason she has not taking her HCTZ we will add the HCTZ to her olmesartan so it is 1 less pill that she has to take and she will call us with an update  Hypertension Medications               carvediloL (COREG) 12.5 MG tablet Take 12.5 mg by mouth 2 (two) times daily.    olmesartan-hydrochlorothiazide (BENICAR HCT) 40-25 mg per tablet Take 1 tablet by mouth once daily.              Other orders  -     nystatin (MYCOSTATIN) powder; Apply topically 4 (four) times daily.  Dispense: 60 g; Refill: 1  -     tirzepatide (MOUNJARO) 2.5 mg/0.5 mL PnIj; Inject 2.5 mg into the skin every 7 days.  Dispense: 4 Pen; Refill: 6  -     olmesartan-hydrochlorothiazide (BENICAR HCT) 40-25 mg per tablet; Take 1 tablet by mouth once daily.  Dispense: 90 tablet; Refill: 3         Follow up in about 3 months (around 5/29/2024) for with labs prior to visit, DIABETIC REVISIT. In addition to their scheduled follow up, the patient has also been instructed to follow up on as needed basis.     Future Appointments   Date Time Provider Department Center   6/5/2024 10:00 AM Nate Barragan MD [facility] [id number]        Nate Barragan MD

## 2024-02-29 NOTE — ASSESSMENT & PLAN NOTE
DC Ozempic secondary to GI side effects, start Mounjaro patient advised to decrease intake of carbonated beverages and to not eat past satiety  Lab Results   Component Value Date    HGBA1C 5.5 02/26/2024

## 2024-02-29 NOTE — ASSESSMENT & PLAN NOTE
For whatever reason she has not taking her HCTZ we will add the HCTZ to her olmesartan so it is 1 less pill that she has to take and she will call us with an update  Hypertension Medications               carvediloL (COREG) 12.5 MG tablet Take 12.5 mg by mouth 2 (two) times daily.    olmesartan-hydrochlorothiazide (BENICAR HCT) 40-25 mg per tablet Take 1 tablet by mouth once daily.

## 2024-04-30 DIAGNOSIS — E78.00 PURE HYPERCHOLESTEROLEMIA: Primary | ICD-10-CM

## 2024-04-30 DIAGNOSIS — E11.9 TYPE 2 DIABETES MELLITUS WITHOUT COMPLICATION, WITHOUT LONG-TERM CURRENT USE OF INSULIN: ICD-10-CM

## 2024-04-30 RX ORDER — SEMAGLUTIDE 1.34 MG/ML
1 INJECTION, SOLUTION SUBCUTANEOUS
Qty: 3 EACH | Refills: 11 | Status: SHIPPED | OUTPATIENT
Start: 2024-04-30 | End: 2024-06-05

## 2024-05-14 LAB
LEFT EYE DM RETINOPATHY: NEGATIVE
RIGHT EYE DM RETINOPATHY: NEGATIVE

## 2024-05-15 ENCOUNTER — PATIENT OUTREACH (OUTPATIENT)
Dept: ADMINISTRATIVE | Facility: HOSPITAL | Age: 74
End: 2024-05-15
Payer: MEDICARE

## 2024-05-15 NOTE — LETTER
AUTHORIZATION FOR RELEASE OF   CONFIDENTIAL INFORMATION    Dear Dr. Robbins,    We are seeing Rosalie Tatum, date of birth 1950, in the clinic at [place] [address] [facility]. Nate Barragan MD is the patient's PCP. Rosalie Tatum has an outstanding lab/procedure at the time we reviewed her chart. In order to help keep her health information updated, she has authorized us to request the following medical record(s):        (  )  MAMMOGRAM                                      (  )  COLONOSCOPY      (  )  PAP SMEAR                                          (  )  OUTSIDE LAB RESULTS     (  )  DEXA SCAN                                          ( X ) DM  EYE EXAM            (  )  FOOT EXAM                                          (  )  ENTIRE RECORD     (  )  OUTSIDE IMMUNIZATIONS                 (  )  _______________         Please fax records to [facility], [place], Tyler M, [phone]       If you have any questions, please contact Evangelina at [phone]            Patient Name: Rosalie Tatum  : 1950  Patient Phone #: [phone]

## 2024-05-15 NOTE — PROGRESS NOTES
Population Health Outreach.Health Maintenance Topic(s) Outreach Outcomes & Actions Taken:    Eye Exam - Outreach Outcomes & Actions Taken  : External Records Requested & Care Team Updated if Applicable  Dr. Robbins    Osteoporosis Screening - Outreach Outcomes & Actions Taken  : External Records Uploaded, Care Team Updated, & History Updated if Applicable  06/23/20 DEXA hyperlinked    Diabetic Foot Exam - Outreach Outcomes & Actions Taken  : External Records Requested & Care Team Updated if Applicable  Dr. Perez,podiatrists    Blood Pressure - Outreach Outcomes & Actions Taken  : Primary Care Follow Up Visit Scheduled  and  At 02/29/24 PCP visit her blood pressure was elevated and she had not been taking her HCTZ she is unsure why. HCTZ added to her olmesartan so it is 1 less pill that she has to take and she was to call PCP with an update .         Additional Notes:  PCP appointment: 06/05/24    If patient calls back-    Need: home BP log &  see if scheduled for 2024 DM Eye exam.     Last DEXA 2020?       Care Management, Digital Medicine, and/or Education Referrals      Next Steps - Referral Actions: Left VM -offer digital medicine if pt calls back.

## 2024-05-15 NOTE — LETTER
AUTHORIZATION FOR RELEASE OF   CONFIDENTIAL INFORMATION    Dear Dr. Perez,    We are seeing Rosalie Tatum, date of birth 1950, in the clinic at [place] [address] [facility]. Nate Barragan MD is the patient's PCP. Rosalie Tatum has an outstanding lab/procedure at the time we reviewed her chart. In order to help keep her health information updated, she has authorized us to request the following medical record(s):        (  )  MAMMOGRAM                                      (  )  COLONOSCOPY      (  )  PAP SMEAR                                          (  )  OUTSIDE LAB RESULTS     (  )  DEXA SCAN                                          (  )  EYE EXAM            ( X ) DM FOOT EXAM                                          (  )  ENTIRE RECORD     (  )  OUTSIDE IMMUNIZATIONS                 (  )  _______________         Please fax records to [facility], [place], Tyler M, MD, [phone]       If you have any questions, please contact Evangelina at [phone]            Patient Name: Rosalie Tatum  : 1950  Patient Phone #: [phone]

## 2024-05-17 DIAGNOSIS — E11.9 TYPE 2 DIABETES MELLITUS WITHOUT COMPLICATION, WITHOUT LONG-TERM CURRENT USE OF INSULIN: Primary | ICD-10-CM

## 2024-05-21 ENCOUNTER — OFFICE VISIT (OUTPATIENT)
Dept: INTERNAL MEDICINE | Facility: CLINIC | Age: 74
End: 2024-05-21
Payer: MEDICARE

## 2024-05-21 ENCOUNTER — TELEPHONE (OUTPATIENT)
Dept: INTERNAL MEDICINE | Facility: CLINIC | Age: 74
End: 2024-05-21
Payer: MEDICARE

## 2024-05-21 VITALS
OXYGEN SATURATION: 98 % | BODY MASS INDEX: 36.99 KG/M2 | HEIGHT: 62 IN | SYSTOLIC BLOOD PRESSURE: 132 MMHG | DIASTOLIC BLOOD PRESSURE: 76 MMHG | HEART RATE: 82 BPM | WEIGHT: 201 LBS

## 2024-05-21 DIAGNOSIS — M25.571 ACUTE RIGHT ANKLE PAIN: ICD-10-CM

## 2024-05-21 DIAGNOSIS — M65.331 TRIGGER MIDDLE FINGER OF RIGHT HAND: ICD-10-CM

## 2024-05-21 DIAGNOSIS — M25.552 ACUTE PAIN OF BOTH HIPS: Primary | ICD-10-CM

## 2024-05-21 DIAGNOSIS — M25.551 ACUTE PAIN OF BOTH HIPS: Primary | ICD-10-CM

## 2024-05-21 DIAGNOSIS — G57.13 MERALGIA PARESTHETICA OF BOTH LOWER EXTREMITIES: ICD-10-CM

## 2024-05-21 PROBLEM — M25.559 ACUTE HIP PAIN: Status: ACTIVE | Noted: 2024-05-21

## 2024-05-21 PROCEDURE — 99214 OFFICE O/P EST MOD 30 MIN: CPT | Mod: 25,,,

## 2024-05-21 PROCEDURE — 96372 THER/PROPH/DIAG INJ SC/IM: CPT | Mod: ,,,

## 2024-05-21 RX ORDER — GABAPENTIN 100 MG/1
100 CAPSULE ORAL NIGHTLY
Qty: 30 CAPSULE | Refills: 11 | Status: SHIPPED | OUTPATIENT
Start: 2024-05-21 | End: 2025-05-21

## 2024-05-21 RX ORDER — KETOROLAC TROMETHAMINE 30 MG/ML
15 INJECTION, SOLUTION INTRAMUSCULAR; INTRAVENOUS
Status: COMPLETED | OUTPATIENT
Start: 2024-05-21 | End: 2024-05-21

## 2024-05-21 RX ORDER — DEXAMETHASONE SODIUM PHOSPHATE 4 MG/ML
4 INJECTION, SOLUTION INTRA-ARTICULAR; INTRALESIONAL; INTRAMUSCULAR; INTRAVENOUS; SOFT TISSUE
Status: COMPLETED | OUTPATIENT
Start: 2024-05-21 | End: 2024-05-21

## 2024-05-21 RX ADMIN — KETOROLAC TROMETHAMINE 15 MG: 30 INJECTION, SOLUTION INTRAMUSCULAR; INTRAVENOUS at 10:05

## 2024-05-21 RX ADMIN — DEXAMETHASONE SODIUM PHOSPHATE 4 MG: 4 INJECTION, SOLUTION INTRA-ARTICULAR; INTRALESIONAL; INTRAMUSCULAR; INTRAVENOUS; SOFT TISSUE at 10:05

## 2024-05-21 NOTE — TELEPHONE ENCOUNTER
----- Message from Charlie Hernandez MA sent at 5/20/2024  9:29 AM CDT -----  Regarding: PV 6/5/24 @ 10:00 Dr. Nelson  1. Are there any outstanding tasks in the patient's chart? Yes, fasting labs    2. Is there any documentation in the chart? No    3.Has patient been seen in an ER, Urgent care clinic, or been admitted since last visit?  If yes, When, where, and why    4. Has patient seen any other healthcare providers since last visit?  If yes, when, where, and why    5. Has patient had any bloodwork or XR done since last visit?    6. Is patient signed up for patient portal?

## 2024-05-21 NOTE — ASSESSMENT & PLAN NOTE
-H/o meralgia paresthetica  -Reports she was previously on Gabapentin, but was stopped for unknown reason.  -resume gabapentin at lower dose nightly

## 2024-05-21 NOTE — ASSESSMENT & PLAN NOTE
-previously seen Dr Garner for left trigger finger  -will refer back to ortho for eval/tx of right trigger finger

## 2024-05-21 NOTE — PROGRESS NOTES
"  Internal Medicine    Patient Name:  Rosalie Tatum   Patient ID: [id number]     Chief Complaint: Leg Pain (Pain, tingling , numbness and burning in bilateral thigh area. Worsens when standing long periods of time. Worsens at night. )      HPI:     Rosalie Tatum is a 73 y.o. female, known to Dr Nelson, is here today for requested visit due to burning bilateral thighs.  Past medical history significant for HTN, arthritis, and ABHILASH (on CPAP).  For the past couple of months, she has noticed bilateral thigh burning/stabbing pain.  Denies gait instability or weakness.  Uses ice pack, which provides some relief.  Also uses OTC Tylenol and Advil, she is not sure if this provides relief.  States pain/burning radiates down BLE.  S/s worsened this weekend after sitting for several hours at graduation ceremony.  Has h/o Meralgia paresthetica and was previously on gabapentin, stopped for unknown reason.    Right middle finger, trigger finger - Reports she has woken up every morning with right middle finger stuck.  Has placed right hand in warm water and massages hand to release finger.  Does not get stuck throughout the day, but does report right hand feels "tight" throughout the day.  Has previously seen Dr Garner for trigger finger injections of left hand; has not seen in several years.    Right ankle - Reports h/o bone spur that was being followed by Dr Perez (podiatry).   Right ankle pain is daily, started approximately one month ago.  She recalls one day where she twisted her ankle, and sandal strap broke.  Denies hearing popping sound.  Reports right foot swells intermittently, she does elevate with rest.    Last Medicare AWV: 8/30/23       Past Medical History:   Diagnosis Date    Arthritis     Hiatal hernia     Hypertension     Primary hypertension 7/19/2023        Past Surgical History:   Procedure Laterality Date    COLON SURGERY      COLONOSCOPY  07/21/2020    EYE SURGERY  May2021        Social History "     Tobacco Use    Smoking status: Never    Smokeless tobacco: Never   Substance and Sexual Activity    Alcohol use: No    Drug use: No    Sexual activity: Yes     Partners: Male     Birth control/protection: Post-menopausal        Current Outpatient Medications   Medication Instructions    carvediloL (COREG) 12.5 mg, Oral, 2 times daily    ergocalciferol (ERGOCALCIFEROL) 50,000 Units, Oral, Every 7 days    famotidine (PEPCID) 40 MG tablet No dose, route, or frequency recorded.    gabapentin (NEURONTIN) 100 mg, Oral, Nightly    linaCLOtide (LINZESS) 145 mcg, Oral, Daily    meloxicam (MOBIC) 15 mg, Oral    MOUNJARO 2.5 mg, Subcutaneous, Every 7 days    multivitamin capsule 1 capsule, Oral, Daily    nystatin (MYCOSTATIN) powder Topical (Top), 4 times daily    olmesartan-hydrochlorothiazide (BENICAR HCT) 40-25 mg per tablet 1 tablet, Oral, Daily    OZEMPIC 1 mg, Subcutaneous, Every 7 days    rosuvastatin (CRESTOR) 10 mg, Oral, Nightly    vitamin E 200 Units, Oral, Daily       Review of patient's allergies indicates:   Allergen Reactions    Codeine Nausea Only     Other reaction(s): Not Indicated    Codeine phosphate (bulk)         Patient Care Team:  Nate Barragan MD as PCP - General (Internal Medicine)  Torsten Robbins OD (Optometry)  Evangelina Faria LPN as Care Coordinator  Rodriguez Webster MD as Consulting Physician (Gastroenterology)  Alexus Strong MD as Consulting Physician (Obstetrics and Gynecology)  Imaging, Richard Myers MD as Consulting Physician (Cardiology)  Tahir Serrano MD as Consulting Physician (General Practice)  Dustin Perez DPM as Consulting Physician (Podiatry)     Subjective:     Review of Systems   Constitutional:  Negative for appetite change, chills, diaphoresis and fever.   HENT:  Negative for sinus pain.    Respiratory:  Negative for cough, shortness of breath and wheezing.    Cardiovascular:  Negative for chest pain, palpitations and leg  "swelling.   Gastrointestinal:  Negative for abdominal pain, diarrhea, nausea and vomiting.   Genitourinary:  Negative for difficulty urinating, dysuria, frequency and hematuria.   Musculoskeletal:  Positive for arthralgias and joint swelling (right ankle). Negative for gait problem and myalgias.   Skin:  Negative for color change and rash.   Allergic/Immunologic: Negative.    Neurological: Negative.  Negative for dizziness, syncope, weakness, light-headedness and numbness.   Hematological: Negative.  Does not bruise/bleed easily.   Psychiatric/Behavioral: Negative.  Negative for dysphoric mood and suicidal ideas. The patient is not nervous/anxious.    All other systems reviewed and are negative.      Objective:     Visit Vitals  /76   Pulse 82   Ht 5' 2" (1.575 m)   Wt 91.2 kg (201 lb)   SpO2 98%   BMI 36.76 kg/m²       Physical Exam  Vitals and nursing note reviewed.   Constitutional:       General: She is not in acute distress.     Appearance: She is obese. She is not ill-appearing.   HENT:      Head: Normocephalic.      Mouth/Throat:      Mouth: Mucous membranes are moist.   Eyes:      General: No scleral icterus.     Extraocular Movements: Extraocular movements intact.      Conjunctiva/sclera: Conjunctivae normal.      Pupils: Pupils are equal, round, and reactive to light.   Cardiovascular:      Rate and Rhythm: Normal rate and regular rhythm.      Pulses:           Dorsalis pedis pulses are 2+ on the right side and 2+ on the left side.      Heart sounds: No murmur heard.     No friction rub. No gallop.   Pulmonary:      Effort: Pulmonary effort is normal. No respiratory distress.      Breath sounds: Normal breath sounds. No wheezing, rhonchi or rales.   Abdominal:      General: Abdomen is flat. Bowel sounds are normal. There is no distension.      Palpations: Abdomen is soft. There is no mass.      Tenderness: There is no abdominal tenderness.   Musculoskeletal:         General: Normal range of motion. "      Cervical back: Normal range of motion and neck supple.      Right foot: Swelling (mild) and tenderness present. No deformity.      Left foot: No swelling, deformity or tenderness.        Legs:         Feet:       Comments: Areas of Bilateral hip burning, tingling.  Right ankle tender to palpation, mild edema   Skin:     General: Skin is warm and dry.   Neurological:      General: No focal deficit present.      Mental Status: She is alert and oriented to person, place, and time.   Psychiatric:         Mood and Affect: Mood normal.         Behavior: Behavior normal.         Labs Reviewed:     Chemistry:  Lab Results   Component Value Date     02/26/2024    K 3.7 02/26/2024    CHLORIDE 107 02/26/2024    BUN 13.6 02/26/2024    CREATININE 0.79 02/26/2024    EGFRNORACEVR >60 02/26/2024    GLUCOSE 102 02/26/2024    CALCIUM 9.5 02/26/2024    ALKPHOS 60 02/26/2024    LABPROT 6.9 02/26/2024    ALBUMIN 4.1 02/26/2024    BILIDIR 0.2 10/04/2022    IBILI 0.60 03/14/2022    AST 21 02/26/2024    ALT 21 02/26/2024    MG 2.2 05/28/2019    [id number] 47.01 05/28/2019    TSH 1.100 11/09/2018        Lab Results   Component Value Date    HGBA1C 5.5 02/26/2024        Hematology:  Lab Results   Component Value Date    WBC 7.10 07/20/2023    HGB 12.5 07/20/2023    HCT 35.7 (L) 07/20/2023     07/20/2023       Lipid Panel:  Lab Results   Component Value Date    CHOL 135 02/26/2024    HDL 46 02/26/2024    LDL 60.00 02/26/2024    TRIG 143 (H) 02/26/2024    TOTALCHOLEST 3 02/26/2024        Urine:  Lab Results   Component Value Date    COLORUA Light-Yellow 02/26/2024    APPEARANCEUA Turbid (A) 02/26/2024    SGUA 1.012 02/26/2024    PHUA 6.0 02/26/2024    PROTEINUA Negative 02/26/2024    GLUCOSEUA Normal 02/26/2024    KETONESUA Negative 02/26/2024    BLOODUA Trace (A) 02/26/2024    NITRITESUA Negative 02/26/2024    LEUKOCYTESUR Negative 02/26/2024    RBCUA 0-5 02/26/2024    WBCUA 0-5 02/26/2024    BACTERIA Few (A) 02/26/2024     SQEPUA Occasional (A) 02/26/2024    HYALINECASTS 1 07/13/2023    CREATRANDUR 87.5 02/26/2024        Assessment:       ICD-10-CM ICD-9-CM   1. Acute pain of both hips  M25.551 719.45    M25.552    2. Meralgia paresthetica of both lower extremities  G57.13 355.1   3. Trigger middle finger of right hand  M65.331 727.03   4. Acute right ankle pain  M25.571 719.47     338.19        Plan:     1. Acute pain of both hips  Assessment & Plan:  -XR lumbar spine to r/o fracture  -Dexamethasone and Toradol injection today  -currently on Mobic 15 mg daily p.r.n., continue  -will reorder Gabapentin 100mg qHS given h/o meralgia paresthetica  -notify clinic if s/s worse or persist      Orders:  -     dexAMETHasone injection 4 mg  -     ketorolac injection 15 mg  -     X-Ray Lumbar Spine 5 View; Future; Expected date: 05/21/2024  -     X-Ray Pelvis Complete min 3 views; Future; Expected date: 05/21/2024    2. Meralgia paresthetica of both lower extremities  Assessment & Plan:  -H/o meralgia paresthetica  -Reports she was previously on Gabapentin, but was stopped for unknown reason.  -resume gabapentin at lower dose nightly    Orders:  -     gabapentin (NEURONTIN) 100 MG capsule; Take 1 capsule (100 mg total) by mouth every evening.  Dispense: 30 capsule; Refill: 11    3. Trigger middle finger of right hand  Assessment & Plan:  -previously seen Dr Garner for left trigger finger  -will refer back to ortho for eval/tx of right trigger finger    Orders:  -     Ambulatory referral/consult to Orthopedics; Future; Expected date: 05/22/2024    4. Acute right ankle pain  Assessment & Plan:  -XR right ankle to r/o fracture  -ok to continue Mobic that is already prescribed  -encouraged to utilize supportive ankle sleeve  -encouraged to utilize supportive tennis shoe, avoid wearing flats to ensure she was having heel support  -discussed R.I.C.E measures    Orders:  -     X-Ray Ankle 2 View Right; Future; Expected date: 05/21/2024          Follow up for Previously scheduled and PRN if need. In addition to their scheduled follow up, the patient has also been instructed to follow up on as needed basis.     Future Appointments   Date Time Provider Department Center   6/5/2024 10:00 AM [facility] Barragan MD [id number]        YUNI Mcdonald

## 2024-05-21 NOTE — ASSESSMENT & PLAN NOTE
-XR right ankle to r/o fracture  -ok to continue Mobic that is already prescribed  -encouraged to utilize supportive ankle sleeve  -encouraged to utilize supportive tennis shoe, avoid wearing flats to ensure she was having heel support  -discussed R.INEVINE fina

## 2024-05-21 NOTE — ASSESSMENT & PLAN NOTE
-XR lumbar spine to r/o fracture  -Dexamethasone and Toradol injection today  -currently on Mobic 15 mg daily p.r.n., continue  -will reorder Gabapentin 100mg qHS given h/o meralgia paresthetica  -notify clinic if s/s worse or persist

## 2024-05-22 ENCOUNTER — HOSPITAL ENCOUNTER (OUTPATIENT)
Dept: RADIOLOGY | Facility: HOSPITAL | Age: 74
Discharge: HOME | End: 2024-05-22
Payer: MEDICARE

## 2024-05-22 DIAGNOSIS — M25.552 ACUTE PAIN OF BOTH HIPS: ICD-10-CM

## 2024-05-22 DIAGNOSIS — M25.571 ACUTE RIGHT ANKLE PAIN: ICD-10-CM

## 2024-05-22 DIAGNOSIS — M25.551 ACUTE PAIN OF BOTH HIPS: ICD-10-CM

## 2024-05-22 PROCEDURE — 73600 X-RAY EXAM OF ANKLE: CPT | Mod: TC,RT

## 2024-05-22 PROCEDURE — 72170 X-RAY EXAM OF PELVIS: CPT | Mod: TC

## 2024-05-22 PROCEDURE — 72110 X-RAY EXAM L-2 SPINE 4/>VWS: CPT | Mod: TC

## 2024-05-23 ENCOUNTER — LAB VISIT (OUTPATIENT)
Dept: LAB | Facility: HOSPITAL | Age: 74
End: 2024-05-23
Attending: INTERNAL MEDICINE
Payer: MEDICARE

## 2024-05-23 DIAGNOSIS — E11.9 TYPE 2 DIABETES MELLITUS WITHOUT COMPLICATION, WITHOUT LONG-TERM CURRENT USE OF INSULIN: ICD-10-CM

## 2024-05-23 LAB
ALBUMIN SERPL-MCNC: 4.1 G/DL (ref 3.4–4.8)
ALBUMIN/GLOB SERPL: 1.5 RATIO (ref 1.1–2)
ALP SERPL-CCNC: 53 UNIT/L (ref 40–150)
ALT SERPL-CCNC: 23 UNIT/L (ref 0–55)
ANION GAP SERPL CALC-SCNC: 9 MEQ/L
AST SERPL-CCNC: 17 UNIT/L (ref 5–34)
BILIRUB SERPL-MCNC: 0.6 MG/DL
BUN SERPL-MCNC: 20.1 MG/DL (ref 9.8–20.1)
CALCIUM SERPL-MCNC: 9.2 MG/DL (ref 8.4–10.2)
CHLORIDE SERPL-SCNC: 106 MMOL/L (ref 98–107)
CHOLEST SERPL-MCNC: 134 MG/DL
CHOLEST/HDLC SERPL: 3 {RATIO} (ref 0–5)
CO2 SERPL-SCNC: 28 MMOL/L (ref 23–31)
CREAT SERPL-MCNC: 0.87 MG/DL (ref 0.55–1.02)
CREAT/UREA NIT SERPL: 23
EST. AVERAGE GLUCOSE BLD GHB EST-MCNC: 128.4 MG/DL
GFR SERPLBLD CREATININE-BSD FMLA CKD-EPI: >60 ML/MIN/1.73/M2
GLOBULIN SER-MCNC: 2.8 GM/DL (ref 2.4–3.5)
GLUCOSE SERPL-MCNC: 88 MG/DL (ref 82–115)
HBA1C MFR BLD: 6.1 %
HDLC SERPL-MCNC: 47 MG/DL (ref 35–60)
LDLC SERPL CALC-MCNC: 63 MG/DL (ref 50–140)
POTASSIUM SERPL-SCNC: 3.6 MMOL/L (ref 3.5–5.1)
PROT SERPL-MCNC: 6.9 GM/DL (ref 5.8–7.6)
SODIUM SERPL-SCNC: 143 MMOL/L (ref 136–145)
TRIGL SERPL-MCNC: 121 MG/DL (ref 37–140)
VLDLC SERPL CALC-MCNC: 24 MG/DL

## 2024-05-23 PROCEDURE — 36415 COLL VENOUS BLD VENIPUNCTURE: CPT

## 2024-05-23 PROCEDURE — 80053 COMPREHEN METABOLIC PANEL: CPT

## 2024-05-23 PROCEDURE — 83036 HEMOGLOBIN GLYCOSYLATED A1C: CPT

## 2024-05-23 PROCEDURE — 80061 LIPID PANEL: CPT

## 2024-05-23 NOTE — PROGRESS NOTES
Health Maintenance Topic(s) Outreach Outcomes & Actions Taken:    Eye Exam - Outreach Outcomes & Actions Taken  : Diabetic Eye External Records Uploaded, Care Team & History Updated if Applicable       Additional Notes:  Diabetic Eye Exam 5/14/2024

## 2024-05-24 ENCOUNTER — PATIENT OUTREACH (OUTPATIENT)
Facility: CLINIC | Age: 74
End: 2024-05-24
Payer: MEDICARE

## 2024-05-24 NOTE — PROGRESS NOTES
Records Received, hyper-linked into chart at this time. The following record(s)  below were uploaded for Health Maintenance .          8/17/2022 FOOT EXAM      Diabetic Foot Exam - Outreach Outcomes & Actions Taken  : External Records Uploaded, History & Care Team Updated if Applicable       Additional Notes:  Upload Diabetic Foot Exam 8/17/2022

## 2024-05-28 ENCOUNTER — TELEPHONE (OUTPATIENT)
Dept: INTERNAL MEDICINE | Facility: CLINIC | Age: 74
End: 2024-05-28
Payer: MEDICARE

## 2024-05-28 NOTE — TELEPHONE ENCOUNTER
----- Message from YUNI Mcdonald sent at 5/28/2024  6:57 AM CDT -----  Stable x-ray pelvis.  Recommended physical therapy and anti-inflammatories.

## 2024-05-28 NOTE — PROGRESS NOTES
Please Inform patient most recent x-ray right ankle indicate some bone spurs.  If still having some discomfort okay to refer to Podiatry for further input.

## 2024-05-28 NOTE — TELEPHONE ENCOUNTER
Spoke with patient and she was given results, will discuss further with Dr Nelson at her next appt.

## 2024-05-28 NOTE — TELEPHONE ENCOUNTER
----- Message from YUNI Mcdonald sent at 5/28/2024  6:56 AM CDT -----  Please Inform patient most recent x-ray right ankle indicate some bone spurs.  If still having some discomfort okay to refer to Podiatry for further input.

## 2024-06-05 ENCOUNTER — OFFICE VISIT (OUTPATIENT)
Dept: INTERNAL MEDICINE | Facility: CLINIC | Age: 74
End: 2024-06-05
Payer: MEDICARE

## 2024-06-05 VITALS
WEIGHT: 202 LBS | OXYGEN SATURATION: 98 % | SYSTOLIC BLOOD PRESSURE: 130 MMHG | HEART RATE: 92 BPM | RESPIRATION RATE: 16 BRPM | HEIGHT: 62 IN | DIASTOLIC BLOOD PRESSURE: 74 MMHG | TEMPERATURE: 97 F | BODY MASS INDEX: 37.17 KG/M2

## 2024-06-05 DIAGNOSIS — I10 PRIMARY HYPERTENSION: ICD-10-CM

## 2024-06-05 DIAGNOSIS — E78.00 PURE HYPERCHOLESTEROLEMIA: ICD-10-CM

## 2024-06-05 DIAGNOSIS — E11.9 TYPE 2 DIABETES MELLITUS WITHOUT COMPLICATION, WITHOUT LONG-TERM CURRENT USE OF INSULIN: ICD-10-CM

## 2024-06-05 DIAGNOSIS — Z78.0 POSTMENOPAUSAL: ICD-10-CM

## 2024-06-05 DIAGNOSIS — G57.13 MERALGIA PARESTHETICA OF BOTH LOWER EXTREMITIES: Primary | ICD-10-CM

## 2024-06-05 PROCEDURE — 99214 OFFICE O/P EST MOD 30 MIN: CPT | Mod: ,,, | Performed by: INTERNAL MEDICINE

## 2024-06-05 RX ORDER — OLMESARTAN MEDOXOMIL 40 MG/1
40 TABLET ORAL DAILY
COMMUNITY
Start: 2024-05-20 | End: 2024-06-05

## 2024-06-05 RX ORDER — KETOCONAZOLE 20 MG/G
CREAM TOPICAL
COMMUNITY
Start: 2024-05-10

## 2024-06-05 RX ORDER — TIRZEPATIDE 2.5 MG/.5ML
2.5 INJECTION, SOLUTION SUBCUTANEOUS
Qty: 2 ML | Refills: 0 | Status: SHIPPED | OUTPATIENT
Start: 2024-06-05

## 2024-06-05 RX ORDER — HYDROCHLOROTHIAZIDE 25 MG/1
25 TABLET ORAL DAILY
Qty: 90 TABLET | Refills: 0 | Status: SHIPPED | OUTPATIENT
Start: 2024-06-05 | End: 2024-06-05

## 2024-06-05 NOTE — ASSESSMENT & PLAN NOTE
Patient's FSGs are uncontrolled due to hyperglycemia on current medication regimen.  Last A1c reviewed-   Lab Results   Component Value Date    HGBA1C 6.1 05/23/2024   Patient with GI side effects on higher dose Ozempic unable to up titrate will switch her over to Mounjaro  Patient advised to call us back in 4 weeks to increase the dose

## 2024-06-05 NOTE — ASSESSMENT & PLAN NOTE
Well controlled.   Continue current BP management  Low Sodium Diet (DASH Diet - Less than 2 grams of sodium per day).  Monitor blood pressure daily and log. Report consistent numbers greater than 140/90.  Maintain healthy weight with goal BMI <30. Exercise 30 minutes per day, 5 days per week.  Smoking cessation encouraged to aid in BP reduction.

## 2024-06-05 NOTE — ASSESSMENT & PLAN NOTE
Lab Results   Component Value Date    LDL 63.00 05/23/2024    TRIG 121 05/23/2024    HDL 47 05/23/2024    TOTALCHOLEST 3 05/23/2024     Continue current regimen   Stressed importance of dietary modifications. Follow a low cholesterol, low saturated fat diet with less that 200mg of cholesterol a day.  Avoid fried foods and high saturated fats (high saturated fats less than 7% of calories).  Add Flax Seed/Fish Oil supplements to diet. Increase dietary fiber.  Regular exercise can reduce LDL and raise HDL. Stressed importance of physical activity 5 times per week for 30 minutes per day.

## 2024-06-05 NOTE — PROGRESS NOTES
Internal Medicine    Patient ID: [id number]     Chief Complaint: Diabetes (3 month f/u)      HPI:     Rosalie Tatum is a 73 y.o. female here today for a follow up.   For whatever reason patient never picked up her Mounjaro she is still on Ozempic she is gained weight since her last visit of course has worsening of her meralgia paresthetica weight is up.  We discussed the importance of losing weight and decreasing pressure off that cutaneous femoral nerve.  She has been under lot of stress her brother-in-law passed away  A1c at 6.1 estimated average sugar at 128 fasting sugar at 88  Past Medical History:   Diagnosis Date    Arthritis     Hiatal hernia     Hypertension     Primary hypertension 7/19/2023        Past Surgical History:   Procedure Laterality Date    COLON SURGERY      COLONOSCOPY  07/21/2020    EYE SURGERY  May2021        Social History     Tobacco Use    Smoking status: Never    Smokeless tobacco: Never   Substance and Sexual Activity    Alcohol use: No    Drug use: No    Sexual activity: Yes     Partners: Male     Birth control/protection: Post-menopausal        Current Outpatient Medications   Medication Instructions    carvediloL (COREG) 12.5 mg, Oral, 2 times daily    famotidine (PEPCID) 40 mg, Oral, Daily    gabapentin (NEURONTIN) 100 mg, Oral, Nightly    ketoconazole (NIZORAL) 2 % cream APPLY 1 APPLICATION ON THE SKIN TWICE A DAY APPLY 2-4 WEEKS AS NEEDED FOR FLARES    linaCLOtide (LINZESS) 145 mcg, Oral, Daily    meloxicam (MOBIC) 15 mg, Oral    MOUNJARO 2.5 mg, Subcutaneous, Every 7 days    multivitamin capsule 1 capsule, Oral, Daily    nystatin (MYCOSTATIN) powder Topical (Top), 4 times daily    olmesartan-hydrochlorothiazide (BENICAR HCT) 40-25 mg per tablet 1 tablet, Oral, Daily    rosuvastatin (CRESTOR) 10 mg, Oral, Nightly    vitamin E 200 Units, Oral, Daily       Review of patient's allergies indicates:   Allergen Reactions    Codeine Nausea Only     Other reaction(s): Not Indicated     "Codeine phosphate (bulk)         Patient Care Team:  Nate Barragan MD as PCP - General (Internal Medicine)  Torsten Robbins OD (Optometry)  Evangelina Faria LPN as Care Coordinator  Rodriguez Webster MD as Consulting Physician (Gastroenterology)  Alexus Strong MD as Consulting Physician (Obstetrics and Gynecology)  Gil, Richard Myers MD as Consulting Physician (Cardiology)  Tahir Serrano MD as Consulting Physician (General Practice)  Dustin Perez DPM as Consulting Physician (Podiatry)     Subjective:     Review of Systems    12 point review of systems conducted, negative except as stated in the history of present illness. See HPI for details.    Objective:     Visit Vitals  /74 (BP Location: Right arm, Patient Position: Sitting, BP Method: Large (Manual))   Pulse 92   Temp 97.3 °F (36.3 °C) (Temporal)   Resp 16   Ht 5' 2" (1.575 m)   Wt 91.6 kg (202 lb)   SpO2 98%   BMI 36.95 kg/m²       Physical Exam  Constitutional:       General: She is not in acute distress.     Appearance: She is obese. She is not toxic-appearing.   HENT:      Head: Normocephalic and atraumatic.      Mouth/Throat:      Mouth: Mucous membranes are moist.      Pharynx: Oropharynx is clear.   Eyes:      Extraocular Movements: Extraocular movements intact.      Conjunctiva/sclera: Conjunctivae normal.      Pupils: Pupils are equal, round, and reactive to light.   Cardiovascular:      Rate and Rhythm: Normal rate and regular rhythm.      Pulses: Normal pulses.      Heart sounds: S1 normal and S2 normal. No murmur heard.     No gallop.   Pulmonary:      Effort: Pulmonary effort is normal. No respiratory distress.      Breath sounds: No decreased air movement.   Abdominal:      General: Bowel sounds are normal.      Palpations: Abdomen is soft.   Musculoskeletal:         General: Normal range of motion.      Cervical back: Normal range of motion and neck supple.   Skin:     General: Skin is " warm and dry.      Capillary Refill: Capillary refill takes less than 2 seconds.      Coloration: Skin is not cyanotic.   Neurological:      General: No focal deficit present.      Mental Status: She is alert and oriented to person, place, and time.      Sensory: Sensation is intact.      Motor: Motor function is intact.   Psychiatric:         Mood and Affect: Mood and affect normal.         Behavior: Behavior is cooperative.         Labs Reviewed:     Chemistry:  Lab Results   Component Value Date     05/23/2024    K 3.6 05/23/2024    BUN 20.1 05/23/2024    CREATININE 0.87 05/23/2024    EGFRNORACEVR >60 05/23/2024    GLUCOSE 88 05/23/2024    CALCIUM 9.2 05/23/2024    ALKPHOS 53 05/23/2024    LABPROT 6.9 05/23/2024    ALBUMIN 4.1 05/23/2024    BILIDIR 0.2 10/04/2022    IBILI 0.60 03/14/2022    AST 17 05/23/2024    ALT 23 05/23/2024    MG 2.2 05/28/2019    [id number] 47.01 05/28/2019    TSH 1.100 11/09/2018        Lab Results   Component Value Date    HGBA1C 6.1 05/23/2024        Hematology:  Lab Results   Component Value Date    WBC 7.10 07/20/2023    HGB 12.5 07/20/2023    HCT 35.7 (L) 07/20/2023     07/20/2023       Lipid Panel:  Lab Results   Component Value Date    CHOL 134 05/23/2024    HDL 47 05/23/2024    LDL 63.00 05/23/2024    TRIG 121 05/23/2024    TOTALCHOLEST 3 05/23/2024        Urine:  Lab Results   Component Value Date    APPEARANCEUA Turbid (A) 02/26/2024    SGUA 1.012 02/26/2024    PROTEINUA Negative 02/26/2024    KETONESUA Negative 02/26/2024    LEUKOCYTESUR Negative 02/26/2024    RBCUA 0-5 02/26/2024    WBCUA 0-5 02/26/2024    BACTERIA Few (A) 02/26/2024    SQEPUA Occasional (A) 02/26/2024    HYALINECASTS 1 07/13/2023    CREATRANDUR 87.5 02/26/2024        Assessment:       ICD-10-CM ICD-9-CM   1. Meralgia paresthetica of both lower extremities  G57.13 355.1   2. Primary hypertension  I10 401.9   3. Postmenopausal  Z78.0 V49.81   4. Pure hypercholesterolemia  E78.00 272.0        Plan:      1. Meralgia paresthetica of both lower extremities  Assessment & Plan:  Weight loss advised  Ok for gabapentin      2. Primary hypertension  Assessment & Plan:  Well controlled.   Continue current BP management  Low Sodium Diet (DASH Diet - Less than 2 grams of sodium per day).  Monitor blood pressure daily and log. Report consistent numbers greater than 140/90.  Maintain healthy weight with goal BMI <30. Exercise 30 minutes per day, 5 days per week.  Smoking cessation encouraged to aid in BP reduction.       3. Postmenopausal  -     DXA Bone Density Axial Skeleton 1 or more sites; Future; Expected date: 06/05/2024    4. Pure hypercholesterolemia  Assessment & Plan:  Lab Results   Component Value Date    LDL 63.00 05/23/2024    TRIG 121 05/23/2024    HDL 47 05/23/2024    TOTALCHOLEST 3 05/23/2024     Continue current regimen   Stressed importance of dietary modifications. Follow a low cholesterol, low saturated fat diet with less that 200mg of cholesterol a day.  Avoid fried foods and high saturated fats (high saturated fats less than 7% of calories).  Add Flax Seed/Fish Oil supplements to diet. Increase dietary fiber.  Regular exercise can reduce LDL and raise HDL. Stressed importance of physical activity 5 times per week for 30 minutes per day.        Other orders  -     tirzepatide (MOUNJARO) 2.5 mg/0.5 mL PnIj; Inject 2.5 mg into the skin every 7 days.  Dispense: 2 mL; Refill: 0  -     Discontinue: hydroCHLOROthiazide (HYDRODIURIL) 25 MG tablet; Take 1 tablet (25 mg total) by mouth once daily.  Dispense: 90 tablet; Refill: 0         Follow up in about 3 months (around 9/5/2024). In addition to their scheduled follow up, the patient has also been instructed to follow up on as needed basis.     Future Appointments   Date Time Provider Department Center   9/5/2024  3:00 PM Nate Barragan MD [facility] [id number] [id number]        Nate Barragan MD  An office visit for an established patient was  performed. 10 minutes was used for reviewing the patients chart prior to the inoice visit done on that same day. 15 minutes was used during the visit in regards to taking the patient history and physical exam. There was also an additional 5 minutes spent on education and counseling regarding medical conditions, current medications including risk/benefit and side effects/adverse events, vaccine counseling. After leaving the exam room, the provider then spent an additional 5 minutes completing the electronic health record.    The patient is receptive, expresses understanding and is agreeable to plan. All questions answered; total time spent was 35 minutes.

## 2024-06-25 DIAGNOSIS — M79.604 PAIN IN BOTH LOWER EXTREMITIES: Primary | ICD-10-CM

## 2024-06-25 DIAGNOSIS — M79.605 PAIN IN BOTH LOWER EXTREMITIES: Primary | ICD-10-CM

## 2024-06-25 DIAGNOSIS — R20.2 TINGLING IN EXTREMITIES: ICD-10-CM

## 2024-06-26 ENCOUNTER — OFFICE VISIT (OUTPATIENT)
Dept: INTERNAL MEDICINE | Facility: CLINIC | Age: 74
End: 2024-06-26
Payer: MEDICARE

## 2024-06-26 VITALS
BODY MASS INDEX: 36.8 KG/M2 | HEART RATE: 86 BPM | WEIGHT: 200 LBS | SYSTOLIC BLOOD PRESSURE: 114 MMHG | HEIGHT: 62 IN | RESPIRATION RATE: 16 BRPM | DIASTOLIC BLOOD PRESSURE: 62 MMHG | TEMPERATURE: 97 F | OXYGEN SATURATION: 99 %

## 2024-06-26 DIAGNOSIS — G57.13 MERALGIA PARESTHETICA OF BOTH LOWER EXTREMITIES: ICD-10-CM

## 2024-06-26 PROCEDURE — 99213 OFFICE O/P EST LOW 20 MIN: CPT | Mod: ,,, | Performed by: INTERNAL MEDICINE

## 2024-06-26 RX ORDER — DICLOFENAC SODIUM 50 MG/1
50 TABLET, DELAYED RELEASE ORAL 2 TIMES DAILY
Qty: 60 TABLET | Refills: 0 | Status: SHIPPED | OUTPATIENT
Start: 2024-06-26

## 2024-06-26 RX ORDER — GABAPENTIN 100 MG/1
300 CAPSULE ORAL NIGHTLY
Qty: 90 CAPSULE | Refills: 3 | Status: SHIPPED | OUTPATIENT
Start: 2024-06-26 | End: 2025-06-26

## 2024-06-26 NOTE — ASSESSMENT & PLAN NOTE
Weight loss advised  Ok for gabapentin; will increase dose  Discontinue Mobic, trial of Voltaren 50 b.i.d.

## 2024-06-26 NOTE — PROGRESS NOTES
Internal Medicine    Patient ID: [id number]     Chief Complaint: Leg Pain      HPI:     Rosalie Tatum is a 73 y.o. female here today for a follow up.   Patient presenting today with meralgia paresthetica to the left thigh taking gabapentin 100 mg at night with minimal improvement.  Taking Mobic as well  No redness warmth or swelling  Past Medical History:   Diagnosis Date    Arthritis     Hiatal hernia     Hypertension     Primary hypertension 7/19/2023        Past Surgical History:   Procedure Laterality Date    COLON SURGERY      COLONOSCOPY  07/21/2020    EYE SURGERY  May2021        Social History     Tobacco Use    Smoking status: Never    Smokeless tobacco: Never   Substance and Sexual Activity    Alcohol use: No    Drug use: No    Sexual activity: Yes     Partners: Male     Birth control/protection: Post-menopausal        Current Outpatient Medications   Medication Instructions    carvediloL (COREG) 12.5 mg, Oral, 2 times daily    diclofenac (VOLTAREN) 50 mg, Oral, 2 times daily    famotidine (PEPCID) 40 mg, Oral, Daily    gabapentin (NEURONTIN) 300 mg, Oral, Nightly    ketoconazole (NIZORAL) 2 % cream APPLY 1 APPLICATION ON THE SKIN TWICE A DAY APPLY 2-4 WEEKS AS NEEDED FOR FLARES    linaCLOtide (LINZESS) 145 mcg, Oral, Daily    MOUNJARO 2.5 mg, Subcutaneous, Every 7 days    multivitamin capsule 1 capsule, Oral, Daily    nystatin (MYCOSTATIN) powder Topical (Top), 4 times daily    olmesartan-hydrochlorothiazide (BENICAR HCT) 40-25 mg per tablet 1 tablet, Oral, Daily    rosuvastatin (CRESTOR) 10 mg, Oral, Nightly    vitamin E 200 Units, Oral, Daily       Review of patient's allergies indicates:   Allergen Reactions    Codeine Nausea Only     Other reaction(s): Not Indicated    Codeine phosphate (bulk)         Patient Care Team:  Nate Barragan MD as PCP - General (Internal Medicine)  Torsten Robbins OD (Optometry)  Evangelina Faria LPN as Care Coordinator  Rodriguez Webster MD as Consulting  "Physician (Gastroenterology)  Alexus Strong MD as Consulting Physician (Obstetrics and Gynecology)  Gil, Richard Myers MD as Consulting Physician (Cardiology)  Tahir Serrano MD as Consulting Physician (General Practice)  Dustin Perez DPM as Consulting Physician (Podiatry)     Subjective:     Review of Systems    12 point review of systems conducted, negative except as stated in the history of present illness. See HPI for details.    Objective:     Visit Vitals  /62 (BP Location: Left arm, Patient Position: Sitting, BP Method: Large (Manual))   Pulse 86   Temp 97.4 °F (36.3 °C) (Temporal)   Resp 16   Ht 5' 2" (1.575 m)   Wt 90.7 kg (200 lb)   SpO2 99%   BMI 36.58 kg/m²       Physical Exam  Constitutional:       Appearance: Normal appearance. She is obese.   Pulmonary:      Effort: Pulmonary effort is normal.   Neurological:      Mental Status: She is alert.   Psychiatric:         Mood and Affect: Mood normal.         Behavior: Behavior normal.         Labs Reviewed:     Chemistry:  Lab Results   Component Value Date     05/23/2024    K 3.6 05/23/2024    BUN 20.1 05/23/2024    CREATININE 0.87 05/23/2024    EGFRNORACEVR >60 05/23/2024    GLUCOSE 88 05/23/2024    CALCIUM 9.2 05/23/2024    ALKPHOS 53 05/23/2024    LABPROT 6.9 05/23/2024    ALBUMIN 4.1 05/23/2024    BILIDIR 0.2 10/04/2022    IBILI 0.60 03/14/2022    AST 17 05/23/2024    ALT 23 05/23/2024    MG 2.2 05/28/2019    [id number] 47.01 05/28/2019    TSH 1.100 11/09/2018        Lab Results   Component Value Date    HGBA1C 6.1 05/23/2024        Hematology:  Lab Results   Component Value Date    WBC 7.10 07/20/2023    HGB 12.5 07/20/2023    HCT 35.7 (L) 07/20/2023     07/20/2023       Lipid Panel:  Lab Results   Component Value Date    CHOL 134 05/23/2024    HDL 47 05/23/2024    LDL 63.00 05/23/2024    TRIG 121 05/23/2024    TOTALCHOLEST 3 05/23/2024        Urine:  Lab Results   Component Value Date    " APPEARANCEUA Turbid (A) 02/26/2024    SGUA 1.012 02/26/2024    PROTEINUA Negative 02/26/2024    KETONESUA Negative 02/26/2024    LEUKOCYTESUR Negative 02/26/2024    RBCUA 0-5 02/26/2024    WBCUA 0-5 02/26/2024    BACTERIA Few (A) 02/26/2024    SQEPUA Occasional (A) 02/26/2024    HYALINECASTS 1 07/13/2023    CREATRANDUR 87.5 02/26/2024        Assessment:       ICD-10-CM ICD-9-CM   1. Meralgia paresthetica of both lower extremities  G57.13 355.1        Plan:     1. Meralgia paresthetica of both lower extremities  Assessment & Plan:  Weight loss advised  Ok for gabapentin; will increase dose  Discontinue Mobic, trial of Voltaren 50 b.i.d.     Orders:  -     gabapentin (NEURONTIN) 100 MG capsule; Take 3 capsules (300 mg total) by mouth every evening.  Dispense: 90 capsule; Refill: 3  -     Ambulatory referral/consult to Physical/Occupational Therapy; Future; Expected date: 07/03/2024    Other orders  -     diclofenac (VOLTAREN) 50 MG EC tablet; Take 1 tablet (50 mg total) by mouth 2 (two) times daily.  Dispense: 60 tablet; Refill: 0         Follow up if symptoms worsen or fail to improve. In addition to their scheduled follow up, the patient has also been instructed to follow up on as needed basis.     Future Appointments   Date Time Provider Department Center   9/5/2024  3:00 PM Nate Barragan MD [facility] [address] [id number]        Nate Barragan MD

## 2024-06-30 DIAGNOSIS — E78.00 PURE HYPERCHOLESTEROLEMIA: Primary | ICD-10-CM

## 2024-06-30 DIAGNOSIS — E11.9 TYPE 2 DIABETES MELLITUS WITHOUT COMPLICATION, WITHOUT LONG-TERM CURRENT USE OF INSULIN: ICD-10-CM

## 2024-07-01 RX ORDER — TIRZEPATIDE 2.5 MG/.5ML
INJECTION, SOLUTION SUBCUTANEOUS
Qty: 2 ML | Refills: 1 | Status: SHIPPED | OUTPATIENT
Start: 2024-07-01

## 2024-07-22 ENCOUNTER — TELEPHONE (OUTPATIENT)
Dept: INTERNAL MEDICINE | Facility: CLINIC | Age: 74
End: 2024-07-22
Payer: MEDICARE

## 2024-07-22 NOTE — TELEPHONE ENCOUNTER
Nate Barragan MD [place], [place], [place]  Caller: Unspecified (Today,  3:31 PM)  So past window for anti- viral treatment

## 2024-07-22 NOTE — TELEPHONE ENCOUNTER
----- Message from Stan Mari sent at 7/22/2024  3:20 PM CDT -----  .Who Called: Rosalie Tatum    Caller is requesting assistance/information from provider's office.    Symptoms (please be specific): n/a   How long has patient had these symptoms:  n/a  List of preferred pharmacies on file (remove unneeded): [unfilled]  If different, enter pharmacy into here including location and phone number: n/a      Preferred Method of Contact: Phone Call  Patient's Preferred Phone Number on File: [phone]   Best Call Back Number, if different:  Additional Information: pt called wanting to inform Dr. Nelson that she is recovering from covid and stopped physical therapy

## 2024-07-22 NOTE — TELEPHONE ENCOUNTER
Spoke to pt who stated that she has had COVID for the las 7-10 days. She was given cough syrup  (promethazine - DM), and is taking Zinc, Vit D, and Vit C. She just wanted to make sure that Dr. Nelson knew.

## 2024-07-23 DIAGNOSIS — M25.559 ACUTE HIP PAIN, UNSPECIFIED LATERALITY: Primary | ICD-10-CM

## 2024-07-23 DIAGNOSIS — M25.571 ACUTE RIGHT ANKLE PAIN: ICD-10-CM

## 2024-07-23 RX ORDER — DICLOFENAC SODIUM 50 MG/1
50 TABLET, DELAYED RELEASE ORAL 2 TIMES DAILY
Qty: 60 TABLET | Refills: 0 | Status: SHIPPED | OUTPATIENT
Start: 2024-07-23

## 2024-08-15 DIAGNOSIS — E11.9 TYPE 2 DIABETES MELLITUS WITHOUT COMPLICATION, WITHOUT LONG-TERM CURRENT USE OF INSULIN: ICD-10-CM

## 2024-08-15 DIAGNOSIS — E78.00 PURE HYPERCHOLESTEROLEMIA: Primary | ICD-10-CM

## 2024-08-15 RX ORDER — ROSUVASTATIN CALCIUM 10 MG/1
10 TABLET, COATED ORAL NIGHTLY
Qty: 90 TABLET | Refills: 3 | Status: SHIPPED | OUTPATIENT
Start: 2024-08-15

## 2024-08-19 DIAGNOSIS — M25.571 ACUTE RIGHT ANKLE PAIN: ICD-10-CM

## 2024-08-19 DIAGNOSIS — M25.559 ACUTE HIP PAIN, UNSPECIFIED LATERALITY: ICD-10-CM

## 2024-08-19 RX ORDER — DICLOFENAC SODIUM 50 MG/1
50 TABLET, DELAYED RELEASE ORAL 2 TIMES DAILY
Qty: 60 TABLET | Refills: 0 | Status: SHIPPED | OUTPATIENT
Start: 2024-08-19

## 2024-08-22 ENCOUNTER — TELEPHONE (OUTPATIENT)
Dept: INTERNAL MEDICINE | Facility: CLINIC | Age: 74
End: 2024-08-22
Payer: MEDICARE

## 2024-08-22 DIAGNOSIS — E11.9 TYPE 2 DIABETES MELLITUS WITHOUT COMPLICATION, WITHOUT LONG-TERM CURRENT USE OF INSULIN: Primary | ICD-10-CM

## 2024-08-22 NOTE — TELEPHONE ENCOUNTER
----- Message from Charlie Hernandez MA sent at 8/22/2024 12:09 PM CDT -----  Regarding: PV 9/5/24 @ 3:00 Dr. Nelson  1. Are there any outstanding tasks in the patient's chart? Yes, fasting labs    2. Is there any documentation in the chart? No    3.Has patient been seen in an ER, Urgent care clinic, or been admitted since last visit?  If yes, When, where, and why    4. Has patient seen any other healthcare providers since last visit?  If yes, when, where, and why    5. Has patient had any bloodwork or XR done since last visit?    6. Is patient signed up for patient portal?

## 2024-08-31 DIAGNOSIS — E11.9 TYPE 2 DIABETES MELLITUS WITHOUT COMPLICATION, WITHOUT LONG-TERM CURRENT USE OF INSULIN: ICD-10-CM

## 2024-08-31 DIAGNOSIS — I10 PRIMARY HYPERTENSION: Primary | ICD-10-CM

## 2024-09-03 RX ORDER — HYDROCHLOROTHIAZIDE 25 MG/1
25 TABLET ORAL
Qty: 90 TABLET | Refills: 0 | Status: SHIPPED | OUTPATIENT
Start: 2024-09-03

## 2024-09-04 ENCOUNTER — LAB VISIT (OUTPATIENT)
Dept: LAB | Facility: HOSPITAL | Age: 74
End: 2024-09-04
Attending: INTERNAL MEDICINE
Payer: MEDICARE

## 2024-09-04 DIAGNOSIS — E11.9 TYPE 2 DIABETES MELLITUS WITHOUT COMPLICATION, WITHOUT LONG-TERM CURRENT USE OF INSULIN: ICD-10-CM

## 2024-09-04 LAB
ALBUMIN SERPL-MCNC: 3.8 G/DL (ref 3.4–4.8)
ALBUMIN/GLOB SERPL: 1.5 RATIO (ref 1.1–2)
ALP SERPL-CCNC: 79 UNIT/L (ref 40–150)
ALT SERPL-CCNC: 32 UNIT/L (ref 0–55)
ANION GAP SERPL CALC-SCNC: 6 MEQ/L
AST SERPL-CCNC: 27 UNIT/L (ref 5–34)
BILIRUB SERPL-MCNC: 0.6 MG/DL
BUN SERPL-MCNC: 18.2 MG/DL (ref 9.8–20.1)
CALCIUM SERPL-MCNC: 8.9 MG/DL (ref 8.4–10.2)
CHLORIDE SERPL-SCNC: 108 MMOL/L (ref 98–107)
CHOLEST SERPL-MCNC: 89 MG/DL
CHOLEST/HDLC SERPL: 3 {RATIO} (ref 0–5)
CO2 SERPL-SCNC: 26 MMOL/L (ref 23–31)
CREAT SERPL-MCNC: 1.09 MG/DL (ref 0.55–1.02)
CREAT/UREA NIT SERPL: 17
EST. AVERAGE GLUCOSE BLD GHB EST-MCNC: 116.9 MG/DL
GFR SERPLBLD CREATININE-BSD FMLA CKD-EPI: 54 ML/MIN/1.73/M2
GLOBULIN SER-MCNC: 2.6 GM/DL (ref 2.4–3.5)
GLUCOSE SERPL-MCNC: 99 MG/DL (ref 82–115)
HBA1C MFR BLD: 5.7 %
HDLC SERPL-MCNC: 29 MG/DL (ref 35–60)
LDLC SERPL CALC-MCNC: 35 MG/DL (ref 50–140)
POTASSIUM SERPL-SCNC: 3.5 MMOL/L (ref 3.5–5.1)
PROT SERPL-MCNC: 6.4 GM/DL (ref 5.8–7.6)
SODIUM SERPL-SCNC: 140 MMOL/L (ref 136–145)
TRIGL SERPL-MCNC: 126 MG/DL (ref 37–140)
VLDLC SERPL CALC-MCNC: 25 MG/DL

## 2024-09-04 PROCEDURE — 80053 COMPREHEN METABOLIC PANEL: CPT

## 2024-09-04 PROCEDURE — 83036 HEMOGLOBIN GLYCOSYLATED A1C: CPT

## 2024-09-04 PROCEDURE — 36415 COLL VENOUS BLD VENIPUNCTURE: CPT

## 2024-09-04 PROCEDURE — 80061 LIPID PANEL: CPT

## 2024-09-05 ENCOUNTER — OFFICE VISIT (OUTPATIENT)
Dept: INTERNAL MEDICINE | Facility: CLINIC | Age: 74
End: 2024-09-05
Payer: MEDICARE

## 2024-09-05 VITALS
RESPIRATION RATE: 16 BRPM | WEIGHT: 195 LBS | HEART RATE: 69 BPM | TEMPERATURE: 97 F | DIASTOLIC BLOOD PRESSURE: 82 MMHG | OXYGEN SATURATION: 97 % | SYSTOLIC BLOOD PRESSURE: 124 MMHG | BODY MASS INDEX: 35.88 KG/M2 | HEIGHT: 62 IN

## 2024-09-05 DIAGNOSIS — Z78.0 POSTMENOPAUSAL: Primary | ICD-10-CM

## 2024-09-05 DIAGNOSIS — E11.9 TYPE 2 DIABETES MELLITUS WITHOUT COMPLICATION, WITHOUT LONG-TERM CURRENT USE OF INSULIN: ICD-10-CM

## 2024-09-05 DIAGNOSIS — Z12.31 SCREENING MAMMOGRAM FOR BREAST CANCER: ICD-10-CM

## 2024-09-05 PROCEDURE — 99214 OFFICE O/P EST MOD 30 MIN: CPT | Mod: ,,, | Performed by: INTERNAL MEDICINE

## 2024-09-05 RX ORDER — ERGOCALCIFEROL 1.25 MG/1
50000 CAPSULE ORAL
COMMUNITY
Start: 2024-07-10

## 2024-09-05 RX ORDER — OMEPRAZOLE 40 MG/1
40 CAPSULE, DELAYED RELEASE ORAL EVERY MORNING
COMMUNITY

## 2024-09-05 RX ORDER — METRONIDAZOLE 500 MG/1
500 TABLET ORAL EVERY 12 HOURS
COMMUNITY

## 2024-09-05 RX ORDER — TIRZEPATIDE 5 MG/.5ML
5 INJECTION, SOLUTION SUBCUTANEOUS
Qty: 2 ML | Refills: 6 | Status: SHIPPED | OUTPATIENT
Start: 2024-09-05

## 2024-09-05 RX ORDER — OLMESARTAN MEDOXOMIL 40 MG/1
40 TABLET ORAL DAILY
COMMUNITY

## 2024-09-05 RX ORDER — TETRACYCLINE HYDROCHLORIDE 500 MG/1
500 CAPSULE ORAL 4 TIMES DAILY
COMMUNITY

## 2024-09-05 RX ORDER — ONDANSETRON HYDROCHLORIDE 8 MG/1
8 TABLET, FILM COATED ORAL EVERY 8 HOURS PRN
COMMUNITY

## 2024-09-05 NOTE — ASSESSMENT & PLAN NOTE
Patient's FSGs are uncontrolled due to hyperglycemia on current medication regimen.  Last A1c reviewed-   Lab Results   Component Value Date    HGBA1C 5.7 09/04/2024   Increase Mounjaro to 5 mg  RTC 6 months, sooner if needed

## 2024-09-05 NOTE — PROGRESS NOTES
Internal Medicine    Patient ID: [id number]     Chief Complaint: Hyperlipidemia (3 month f/u) and Weight Loss (3 month f/u)      HPI:     Rosalie Tatum is a 73 y.o. female here today for a follow up.   Recently diagnosed with H pylori and on antibiotics for that her A1c is currently at 5.7 fasting sugar at 99 on Mounjaro 2.5 her weight is currently at 195 with a BMI of 35.  Currently LDL at 35.    Past Medical History:   Diagnosis Date    Arthritis     Hiatal hernia     Hypertension     Primary hypertension 7/19/2023        Past Surgical History:   Procedure Laterality Date    COLON SURGERY      COLONOSCOPY  07/21/2020    EYE SURGERY  May2021        Social History     Tobacco Use    Smoking status: Never    Smokeless tobacco: Never   Substance and Sexual Activity    Alcohol use: No    Drug use: No    Sexual activity: Yes     Partners: Male     Birth control/protection: Post-menopausal        Current Outpatient Medications   Medication Instructions    carvediloL (COREG) 12.5 mg, Oral, 2 times daily    diclofenac (VOLTAREN) 50 mg, Oral, 2 times daily    ergocalciferol (ERGOCALCIFEROL) 50,000 Units, Oral, Every 7 days    famotidine (PEPCID) 40 mg, Oral, Daily    gabapentin (NEURONTIN) 300 mg, Oral, Nightly    hydroCHLOROthiazide (HYDRODIURIL) 25 mg, Oral    ketoconazole (NIZORAL) 2 % cream APPLY 1 APPLICATION ON THE SKIN TWICE A DAY APPLY 2-4 WEEKS AS NEEDED FOR FLARES    linaCLOtide (LINZESS) 145 mcg, Oral, Daily    metroNIDAZOLE (FLAGYL) 500 mg, Oral, Every 12 hours, For 14 days    MOUNJARO 5 mg, Subcutaneous, Every 7 days    multivitamin capsule 1 capsule, Oral, Daily    nystatin (MYCOSTATIN) powder Topical (Top), 4 times daily    olmesartan (BENICAR) 40 mg, Oral, Daily    omeprazole (PRILOSEC) 40 mg, Oral, Every morning, For 10 days    ondansetron (ZOFRAN) 8 mg, Oral, Every 8 hours PRN    rosuvastatin (CRESTOR) 10 mg, Oral, Nightly    tetracycline (ACHROMYCIN,SUMYCIN) 500 mg, Oral, 4 times daily, For 14 days     "vitamin E 200 Units, Oral, Daily       Review of patient's allergies indicates:   Allergen Reactions    Codeine Nausea Only     Other reaction(s): Not Indicated    Codeine phosphate (bulk)         Patient Care Team:  Nate Barragan MD as PCP - General (Internal Medicine)  Torsten Robbins OD (Optometry)  Evangelina Faria LPN as Care Coordinator  Rodriguez Webster MD as Consulting Physician (Gastroenterology)  Alexus Strong MD as Consulting Physician (Obstetrics and Gynecology)  Nupur Cordero Wade B, MD as Consulting Physician (Cardiology)  Tahir Serrano MD as Consulting Physician (General Practice)  Dustin Perez DPM as Consulting Physician (Podiatry)     Subjective:     Review of Systems    12 point review of systems conducted, negative except as stated in the history of present illness. See HPI for details.    Objective:     Visit Vitals  /82 (BP Location: Left arm, Patient Position: Sitting, BP Method: Medium (Manual))   Pulse 69   Temp 96.7 °F (35.9 °C) (Temporal)   Resp 16   Ht 5' 2" (1.575 m)   Wt 88.5 kg (195 lb)   SpO2 97%   BMI 35.67 kg/m²       Physical Exam  Constitutional:       Appearance: Normal appearance. She is obese.   HENT:      Head: Normocephalic and atraumatic.   Eyes:      Extraocular Movements: Extraocular movements intact.      Pupils: Pupils are equal, round, and reactive to light.   Cardiovascular:      Rate and Rhythm: Normal rate and regular rhythm.   Pulmonary:      Effort: Pulmonary effort is normal.      Breath sounds: Normal breath sounds.   Skin:     General: Skin is warm and dry.   Neurological:      General: No focal deficit present.      Mental Status: She is alert.   Psychiatric:         Mood and Affect: Mood normal.         Labs Reviewed:     Chemistry:  Lab Results   Component Value Date     09/04/2024    K 3.5 09/04/2024    BUN 18.2 09/04/2024    CREATININE 1.09 (H) 09/04/2024    EGFRNORACEVR 54 09/04/2024    " GLUCOSE 99 09/04/2024    CALCIUM 8.9 09/04/2024    ALKPHOS 79 09/04/2024    LABPROT 6.4 09/04/2024    ALBUMIN 3.8 09/04/2024    BILIDIR 0.2 10/04/2022    IBILI 0.60 03/14/2022    AST 27 09/04/2024    ALT 32 09/04/2024    MG 2.2 05/28/2019    RLAZUMCN20UE 47.01 05/28/2019    TSH 1.100 11/09/2018        Lab Results   Component Value Date    HGBA1C 5.7 09/04/2024        Hematology:  Lab Results   Component Value Date    WBC 7.10 07/20/2023    HGB 12.5 07/20/2023    HCT 35.7 (L) 07/20/2023     07/20/2023       Lipid Panel:  Lab Results   Component Value Date    CHOL 89 09/04/2024    HDL 29 (L) 09/04/2024    LDL 35.00 (L) 09/04/2024    TRIG 126 09/04/2024    TOTALCHOLEST 3 09/04/2024        Urine:  Lab Results   Component Value Date    APPEARANCEUA Turbid (A) 02/26/2024    SGUA 1.012 02/26/2024    PROTEINUA Negative 02/26/2024    KETONESUA Negative 02/26/2024    LEUKOCYTESUR Negative 02/26/2024    RBCUA 0-5 02/26/2024    WBCUA 0-5 02/26/2024    BACTERIA Few (A) 02/26/2024    SQEPUA Occasional (A) 02/26/2024    HYALINECASTS 1 07/13/2023    CREATRANDUR 87.5 02/26/2024        Assessment:       ICD-10-CM ICD-9-CM   1. Postmenopausal  Z78.0 V49.81   2. Screening mammogram for breast cancer  Z12.31 V76.12   3. Type 2 diabetes mellitus without complication, without long-term current use of insulin  E11.9 250.00        Plan:     1. Postmenopausal  -     DXA Bone Density Axial Skeleton 1 or more sites; Future; Expected date: 09/05/2024    2. Screening mammogram for breast cancer  -     Mammo Digital Screening Bilat; Future; Expected date: 11/05/2024    3. Type 2 diabetes mellitus without complication, without long-term current use of insulin  Assessment & Plan:  Patient's FSGs are uncontrolled due to hyperglycemia on current medication regimen.  Last A1c reviewed-   Lab Results   Component Value Date    HGBA1C 5.7 09/04/2024   Increase Mounjaro to 5 mg  RTC 6 months, sooner if needed      Other orders  -     tirzepatide  (MOUNJARO) 5 mg/0.5 mL PnIj; Inject 5 mg into the skin every 7 days.  Dispense: 2 mL; Refill: 6         Follow up in about 6 months (around 3/5/2025). In addition to their scheduled follow up, the patient has also been instructed to follow up on as needed basis.     Future Appointments   Date Time Provider Department Center   3/7/2025  9:20 AM Nate Barragan MD [id number]        Nate Barragan MD

## 2024-09-15 DIAGNOSIS — M25.571 ACUTE RIGHT ANKLE PAIN: ICD-10-CM

## 2024-09-15 DIAGNOSIS — M25.559 ACUTE HIP PAIN, UNSPECIFIED LATERALITY: ICD-10-CM

## 2024-09-16 RX ORDER — DICLOFENAC SODIUM 50 MG/1
50 TABLET, DELAYED RELEASE ORAL 2 TIMES DAILY
Qty: 60 TABLET | Refills: 0 | Status: SHIPPED | OUTPATIENT
Start: 2024-09-16

## 2024-09-23 ENCOUNTER — TELEPHONE (OUTPATIENT)
Dept: INTERNAL MEDICINE | Facility: CLINIC | Age: 74
End: 2024-09-23
Payer: MEDICARE

## 2024-09-23 NOTE — TELEPHONE ENCOUNTER
----- Message from Jennifer Lovelace sent at 9/23/2024  9:04 AM CDT -----  Regarding: MMG orders  .Who Called: Rosalie Tatum    Caller is requesting to schedule their annual mammogram appointment. Order is not listed in Epic. Please enter order and contact patient to schedule.    Where would they like the mammogram performed? [facility]     Preferred Method of Contact: Phone Call  Patient's Preferred Phone Number on File: [phone]   Best Call Back Number, if different:  Additional Information: pt would like her MMG orders to be sent to [facility], she doesn't have an appointment yet but is needing schedule an appointment so please send orders to [facility] and please call patient when order have been sent.Thanks

## 2024-10-14 DIAGNOSIS — M25.559 ACUTE HIP PAIN, UNSPECIFIED LATERALITY: ICD-10-CM

## 2024-10-14 DIAGNOSIS — M25.571 ACUTE RIGHT ANKLE PAIN: ICD-10-CM

## 2024-10-14 RX ORDER — DICLOFENAC SODIUM 50 MG/1
50 TABLET, DELAYED RELEASE ORAL 2 TIMES DAILY
Qty: 60 TABLET | Refills: 0 | Status: SHIPPED | OUTPATIENT
Start: 2024-10-14

## 2024-10-30 ENCOUNTER — DOCUMENTATION ONLY (OUTPATIENT)
Dept: INTERNAL MEDICINE | Facility: CLINIC | Age: 74
End: 2024-10-30
Payer: MEDICARE

## 2024-10-30 LAB
BCS RECOMMENDATION EXT: NORMAL
BMD RECOMMENDATION EXT: NORMAL

## 2024-10-31 ENCOUNTER — LAB REQUISITION (OUTPATIENT)
Dept: LAB | Facility: HOSPITAL | Age: 74
End: 2024-10-31
Payer: MEDICARE

## 2024-10-31 DIAGNOSIS — A04.8 OTHER SPECIFIED BACTERIAL INTESTINAL INFECTIONS: ICD-10-CM

## 2024-10-31 PROCEDURE — 87338 HPYLORI STOOL AG IA: CPT | Performed by: NURSE PRACTITIONER

## 2024-11-01 LAB — H. PYLORI STOOL: NEGATIVE

## 2024-11-07 ENCOUNTER — TELEPHONE (OUTPATIENT)
Dept: INTERNAL MEDICINE | Facility: CLINIC | Age: 74
End: 2024-11-07
Payer: MEDICARE

## 2024-11-07 NOTE — TELEPHONE ENCOUNTER
----- Message from Jaylin sent at 11/7/2024  1:29 PM CST -----  Who [facility]    Caller is requesting assistance/information from provider's office.    Symptoms (please be specific):    How long has patient had these symptoms:    List of preferred pharmacies on file (remove unneeded): [unfilled]  If different, enter pharmacy into here including location and phone number:       Preferred Method of Contact: Phone Call  Patient's Preferred Phone Number on File: [phone]   Best Call Back Number, if different: Fax [phone]  Additional Information: States they need fax of pt's most recent lab results.

## 2024-11-13 ENCOUNTER — DOCUMENTATION ONLY (OUTPATIENT)
Dept: INTERNAL MEDICINE | Facility: CLINIC | Age: 74
End: 2024-11-13
Payer: MEDICARE

## 2024-11-29 DIAGNOSIS — E11.9 TYPE 2 DIABETES MELLITUS WITHOUT COMPLICATION, WITHOUT LONG-TERM CURRENT USE OF INSULIN: ICD-10-CM

## 2024-11-29 DIAGNOSIS — I10 PRIMARY HYPERTENSION: ICD-10-CM

## 2024-12-02 RX ORDER — HYDROCHLOROTHIAZIDE 25 MG/1
25 TABLET ORAL
Qty: 90 TABLET | Refills: 0 | Status: SHIPPED | OUTPATIENT
Start: 2024-12-02

## 2024-12-09 DIAGNOSIS — M25.571 ACUTE RIGHT ANKLE PAIN: ICD-10-CM

## 2024-12-09 DIAGNOSIS — M25.559 ACUTE HIP PAIN, UNSPECIFIED LATERALITY: ICD-10-CM

## 2024-12-09 RX ORDER — DICLOFENAC SODIUM 50 MG/1
50 TABLET, DELAYED RELEASE ORAL 2 TIMES DAILY
Qty: 60 TABLET | Refills: 0 | Status: SHIPPED | OUTPATIENT
Start: 2024-12-09

## 2025-01-05 DIAGNOSIS — M25.559 ACUTE HIP PAIN, UNSPECIFIED LATERALITY: ICD-10-CM

## 2025-01-05 DIAGNOSIS — M25.571 ACUTE RIGHT ANKLE PAIN: ICD-10-CM

## 2025-01-06 RX ORDER — DICLOFENAC SODIUM 50 MG/1
50 TABLET, DELAYED RELEASE ORAL 2 TIMES DAILY
Qty: 60 TABLET | Refills: 0 | Status: SHIPPED | OUTPATIENT
Start: 2025-01-06

## 2025-02-03 ENCOUNTER — TELEPHONE (OUTPATIENT)
Dept: INTERNAL MEDICINE | Facility: CLINIC | Age: 75
End: 2025-02-03
Payer: MEDICARE

## 2025-02-03 DIAGNOSIS — M25.571 ACUTE RIGHT ANKLE PAIN: ICD-10-CM

## 2025-02-03 DIAGNOSIS — M25.559 ACUTE HIP PAIN, UNSPECIFIED LATERALITY: ICD-10-CM

## 2025-02-03 RX ORDER — DICLOFENAC SODIUM 50 MG/1
50 TABLET, DELAYED RELEASE ORAL 2 TIMES DAILY
Qty: 60 TABLET | Refills: 0 | Status: SHIPPED | OUTPATIENT
Start: 2025-02-03 | End: 2025-03-03

## 2025-02-03 NOTE — TELEPHONE ENCOUNTER
Pt called stating that the mounjaro gave her vomiting, uncontrollable diarrhea, and nausea. Pt stated that she had the same reaction to ozempic. Would like to know if there is something else she can take other than an injection? Please advise

## 2025-02-03 NOTE — TELEPHONE ENCOUNTER
----- Message from Tiesha sent at 1/31/2025  3:12 PM CST -----  Who Called: Rosalie Tatum    Caller is requesting assistance/information from provider's office.    Symptoms (please be specific): n/a   How long has patient had these symptoms:  n/a  List of preferred pharmacies on file (remove unneeded): [unfilled]  If different, enter pharmacy into here including location and phone number: n/a      Preferred Method of Contact: Phone Call  Patient's Preferred Phone Number on File: [phone]   Best Call Back Number, if different:  Additional Information:  medical advice, pt called to discuss medication MOUNJARO that she has had an allergic reaction, please advised, thanks

## 2025-02-20 ENCOUNTER — TELEPHONE (OUTPATIENT)
Dept: INTERNAL MEDICINE | Facility: CLINIC | Age: 75
End: 2025-02-20
Payer: MEDICARE

## 2025-02-20 DIAGNOSIS — I10 PRIMARY HYPERTENSION: ICD-10-CM

## 2025-02-20 DIAGNOSIS — E55.9 VITAMIN D DEFICIENCY: ICD-10-CM

## 2025-02-20 DIAGNOSIS — E78.00 PURE HYPERCHOLESTEROLEMIA: ICD-10-CM

## 2025-02-20 DIAGNOSIS — E11.9 TYPE 2 DIABETES MELLITUS WITHOUT COMPLICATION, WITHOUT LONG-TERM CURRENT USE OF INSULIN: Primary | ICD-10-CM

## 2025-02-20 NOTE — TELEPHONE ENCOUNTER
----- Message from Med Assistant Guerra sent at 2/20/2025  2:05 PM CST -----  Regarding: PV 3/7/25 @ 9:20 Dr. Nelson  1. Are there any outstanding tasks in the patient's chart? Yes, Fasting Labs 2. Does patient have home blood pressure cuff?  [ ] Yes  /   [ ] No(If yes, please have patient bring to appointment for validation.)3. Remind patient to bring in a list of medications or bottles of all medications including: A. All Prescription MedicationsB. Over-the-Counter Supplements and/or VitaminsC. Drops (ear and/or eye)D. Topical Creams

## 2025-03-03 DIAGNOSIS — M25.559 ACUTE HIP PAIN, UNSPECIFIED LATERALITY: ICD-10-CM

## 2025-03-03 DIAGNOSIS — M25.571 ACUTE RIGHT ANKLE PAIN: ICD-10-CM

## 2025-03-03 RX ORDER — DICLOFENAC SODIUM 50 MG/1
50 TABLET, DELAYED RELEASE ORAL 2 TIMES DAILY
Qty: 60 TABLET | Refills: 0 | Status: SHIPPED | OUTPATIENT
Start: 2025-03-03

## 2025-03-06 ENCOUNTER — LAB VISIT (OUTPATIENT)
Dept: LAB | Facility: HOSPITAL | Age: 75
End: 2025-03-06
Attending: INTERNAL MEDICINE
Payer: MEDICARE

## 2025-03-06 DIAGNOSIS — E11.9 TYPE 2 DIABETES MELLITUS WITHOUT COMPLICATION, WITHOUT LONG-TERM CURRENT USE OF INSULIN: ICD-10-CM

## 2025-03-06 DIAGNOSIS — I10 PRIMARY HYPERTENSION: ICD-10-CM

## 2025-03-06 DIAGNOSIS — E55.9 VITAMIN D DEFICIENCY: ICD-10-CM

## 2025-03-06 DIAGNOSIS — E78.00 PURE HYPERCHOLESTEROLEMIA: ICD-10-CM

## 2025-03-06 LAB
25(OH)D3+25(OH)D2 SERPL-MCNC: 30 NG/ML (ref 30–80)
ALBUMIN SERPL-MCNC: 4 G/DL (ref 3.4–4.8)
ALBUMIN/GLOB SERPL: 1.3 RATIO (ref 1.1–2)
ALP SERPL-CCNC: 75 UNIT/L (ref 40–150)
ALT SERPL-CCNC: 37 UNIT/L (ref 0–55)
ANION GAP SERPL CALC-SCNC: 9 MEQ/L
AST SERPL-CCNC: 23 UNIT/L (ref 5–34)
BACTERIA #/AREA URNS AUTO: ABNORMAL /HPF
BASOPHILS # BLD AUTO: 0.06 X10(3)/MCL
BASOPHILS NFR BLD AUTO: 1 %
BILIRUB SERPL-MCNC: 0.7 MG/DL
BILIRUB UR QL STRIP.AUTO: NEGATIVE
BUN SERPL-MCNC: 15 MG/DL (ref 9.8–20.1)
CALCIUM SERPL-MCNC: 9.1 MG/DL (ref 8.4–10.2)
CHLORIDE SERPL-SCNC: 109 MMOL/L (ref 98–107)
CHOLEST SERPL-MCNC: 149 MG/DL
CHOLEST/HDLC SERPL: 3 {RATIO} (ref 0–5)
CLARITY UR: CLEAR
CO2 SERPL-SCNC: 24 MMOL/L (ref 23–31)
COLOR UR AUTO: ABNORMAL
CREAT SERPL-MCNC: 0.78 MG/DL (ref 0.55–1.02)
CREAT UR-MCNC: 104.6 MG/DL (ref 45–106)
CREAT/UREA NIT SERPL: 19
EOSINOPHIL # BLD AUTO: 0.18 X10(3)/MCL (ref 0–0.9)
EOSINOPHIL NFR BLD AUTO: 3 %
ERYTHROCYTE [DISTWIDTH] IN BLOOD BY AUTOMATED COUNT: 15.5 % (ref 11.5–17)
EST. AVERAGE GLUCOSE BLD GHB EST-MCNC: 131.2 MG/DL
GFR SERPLBLD CREATININE-BSD FMLA CKD-EPI: >60 ML/MIN/1.73/M2
GLOBULIN SER-MCNC: 3.1 GM/DL (ref 2.4–3.5)
GLUCOSE SERPL-MCNC: 108 MG/DL (ref 82–115)
GLUCOSE UR QL STRIP: NORMAL
HBA1C MFR BLD: 6.2 %
HCT VFR BLD AUTO: 35.4 % (ref 37–47)
HDLC SERPL-MCNC: 44 MG/DL (ref 35–60)
HGB BLD-MCNC: 12 G/DL (ref 12–16)
HGB UR QL STRIP: ABNORMAL
IMM GRANULOCYTES # BLD AUTO: 0.02 X10(3)/MCL (ref 0–0.04)
IMM GRANULOCYTES NFR BLD AUTO: 0.3 %
KETONES UR QL STRIP: NEGATIVE
LDLC SERPL CALC-MCNC: 79 MG/DL (ref 50–140)
LEUKOCYTE ESTERASE UR QL STRIP: NEGATIVE
LYMPHOCYTES # BLD AUTO: 2.38 X10(3)/MCL (ref 0.6–4.6)
LYMPHOCYTES NFR BLD AUTO: 40.1 %
MCH RBC QN AUTO: 27.8 PG (ref 27–31)
MCHC RBC AUTO-ENTMCNC: 33.9 G/DL (ref 33–36)
MCV RBC AUTO: 81.9 FL (ref 80–94)
MICROALBUMIN UR-MCNC: 8.6 UG/ML
MICROALBUMIN/CREAT RATIO PNL UR: 8.2 MG/GM CR (ref 0–30)
MONOCYTES # BLD AUTO: 0.75 X10(3)/MCL (ref 0.1–1.3)
MONOCYTES NFR BLD AUTO: 12.6 %
MUCOUS THREADS URNS QL MICRO: ABNORMAL /LPF
NEUTROPHILS # BLD AUTO: 2.54 X10(3)/MCL (ref 2.1–9.2)
NEUTROPHILS NFR BLD AUTO: 43 %
NITRITE UR QL STRIP: NEGATIVE
NRBC BLD AUTO-RTO: 0 %
PH UR STRIP: 5.5 [PH]
PLATELET # BLD AUTO: 299 X10(3)/MCL (ref 130–400)
PMV BLD AUTO: 11.2 FL (ref 7.4–10.4)
POTASSIUM SERPL-SCNC: 3.9 MMOL/L (ref 3.5–5.1)
PROT SERPL-MCNC: 7.1 GM/DL (ref 5.8–7.6)
PROT UR QL STRIP: NEGATIVE
RBC # BLD AUTO: 4.32 X10(6)/MCL (ref 4.2–5.4)
RBC #/AREA URNS AUTO: ABNORMAL /HPF
SODIUM SERPL-SCNC: 142 MMOL/L (ref 136–145)
SP GR UR STRIP.AUTO: 1.02 (ref 1–1.03)
SQUAMOUS #/AREA URNS LPF: ABNORMAL /HPF
TRIGL SERPL-MCNC: 128 MG/DL (ref 37–140)
TSH SERPL-ACNC: 1.03 UIU/ML (ref 0.35–4.94)
UROBILINOGEN UR STRIP-ACNC: NORMAL
VLDLC SERPL CALC-MCNC: 26 MG/DL
WBC # BLD AUTO: 5.93 X10(3)/MCL (ref 4.5–11.5)
WBC #/AREA URNS AUTO: ABNORMAL /HPF

## 2025-03-06 PROCEDURE — 82043 UR ALBUMIN QUANTITATIVE: CPT

## 2025-03-06 PROCEDURE — 83036 HEMOGLOBIN GLYCOSYLATED A1C: CPT

## 2025-03-06 PROCEDURE — 84443 ASSAY THYROID STIM HORMONE: CPT

## 2025-03-06 PROCEDURE — 80053 COMPREHEN METABOLIC PANEL: CPT

## 2025-03-06 PROCEDURE — 85025 COMPLETE CBC W/AUTO DIFF WBC: CPT

## 2025-03-06 PROCEDURE — 81001 URINALYSIS AUTO W/SCOPE: CPT

## 2025-03-06 PROCEDURE — 36415 COLL VENOUS BLD VENIPUNCTURE: CPT

## 2025-03-06 PROCEDURE — 82306 VITAMIN D 25 HYDROXY: CPT

## 2025-03-06 PROCEDURE — 80061 LIPID PANEL: CPT

## 2025-03-07 ENCOUNTER — OFFICE VISIT (OUTPATIENT)
Dept: INTERNAL MEDICINE | Facility: CLINIC | Age: 75
End: 2025-03-07
Payer: MEDICARE

## 2025-03-07 VITALS
BODY MASS INDEX: 37.54 KG/M2 | RESPIRATION RATE: 16 BRPM | SYSTOLIC BLOOD PRESSURE: 136 MMHG | WEIGHT: 204 LBS | TEMPERATURE: 98 F | OXYGEN SATURATION: 98 % | DIASTOLIC BLOOD PRESSURE: 74 MMHG | HEART RATE: 87 BPM | HEIGHT: 62 IN

## 2025-03-07 DIAGNOSIS — A04.8 H. PYLORI INFECTION: ICD-10-CM

## 2025-03-07 DIAGNOSIS — Z00.00 MEDICARE ANNUAL WELLNESS VISIT, SUBSEQUENT: Primary | ICD-10-CM

## 2025-03-07 DIAGNOSIS — Z78.0 POSTMENOPAUSAL STATE: ICD-10-CM

## 2025-03-07 DIAGNOSIS — Z12.31 SCREENING MAMMOGRAM FOR BREAST CANCER: ICD-10-CM

## 2025-03-07 DIAGNOSIS — I10 PRIMARY HYPERTENSION: ICD-10-CM

## 2025-03-07 DIAGNOSIS — E66.01 SEVERE OBESITY (BMI 35.0-39.9) WITH COMORBIDITY: ICD-10-CM

## 2025-03-07 DIAGNOSIS — E11.9 TYPE 2 DIABETES MELLITUS WITHOUT COMPLICATION, WITHOUT LONG-TERM CURRENT USE OF INSULIN: ICD-10-CM

## 2025-03-07 DIAGNOSIS — E78.00 PURE HYPERCHOLESTEROLEMIA: ICD-10-CM

## 2025-03-07 PROBLEM — M25.559 ACUTE HIP PAIN: Status: RESOLVED | Noted: 2024-05-21 | Resolved: 2025-03-07

## 2025-03-07 PROBLEM — M65.331 TRIGGER MIDDLE FINGER OF RIGHT HAND: Status: RESOLVED | Noted: 2024-05-21 | Resolved: 2025-03-07

## 2025-03-07 PROBLEM — D72.829 LEUKOCYTOSIS: Status: RESOLVED | Noted: 2023-07-20 | Resolved: 2025-03-07

## 2025-03-07 PROBLEM — M25.571 ACUTE RIGHT ANKLE PAIN: Status: RESOLVED | Noted: 2024-05-21 | Resolved: 2025-03-07

## 2025-03-07 PROBLEM — G57.13 MERALGIA PARESTHETICA OF BOTH LOWER EXTREMITIES: Status: RESOLVED | Noted: 2024-05-21 | Resolved: 2025-03-07

## 2025-03-07 RX ORDER — UREA 10 %
220 LOTION (ML) TOPICAL DAILY
COMMUNITY

## 2025-03-07 NOTE — PROGRESS NOTES
Internal Medicine      Patient ID: [id number]     Chief Complaint: Medicare Annual Wellness     HPI:     Rosalie Tatum is a 74 y.o. female here today for a Medicare Annual Wellness visit and comprehensive Health Risk Assessment.     Patient reports fatigue, tiredness, and weight gain. In early January, she had two episodes of GI distress, characterized by diarrhea and vomiting, approximately 3 weeks apart, both following a Thursday injection of Mounjaro. The first episode occurred after taking Linzess on Friday morning. She managed symptoms by consuming crackers and juice for about a week. During the second episode, she had not eaten breakfast or taken any medication before symptoms began.    She was evaluated at an urgent care facility, where an x-ray of the stomach was performed. The urgent care provider suggested the symptoms might be related to dietary factors, particularly fatty foods consumption. A gastroenterologist evaluation suggested the symptoms might be related to fibromyalgia and irritable bowel syndrome. She acknowledges occasionally eating fried foods but does not recall specific dietary triggers for these episodes.    Currently, she reports feeling bloated and full after eating, with a sensation of impaired digestive transit. These symptoms have persisted for about a month since discontinuing Mounjaro. She also mentions a history of H. pylori, diagnosed through an upper GI study, but is unsure if this could be recurring.    Her last A1C was 6.2, up from a previous 5.7. She was initially on a 2.5 mg dose of Mounjaro, which was increased to 5 mg in September. She reports no issues with the lower dose but had problems after the increase.    TEST RESULTS:  Patient's A1C was previously 5.7 and most recently 6.2. Her most recent glucose level was 108. Her cholesterol LDL was previously 35 and most recently 79. Patient previously completed a stool test, and the results were okay.    IMAGING:  An abdominal  X-ray was completed at urgent care in early January, with results showing no issues related to the stomach. Patient also underwent an upper GI at an unspecified time in the past, which revealed the presence of H. pylori bacteria.               Health Maintenance         Date Due Completion Date    TETANUS VACCINE Never done ---    RSV Vaccine (Age 60+ and Pregnant patients) (1 - Risk 60-74 years 1-dose series) Never done ---    Shingles Vaccine (2 of 3) 01/18/2017 11/23/2016    Foot Exam 08/17/2023 8/17/2022    Diabetic Eye Exam 05/14/2025 5/14/2024    Colorectal Cancer Screening 07/21/2025 7/21/2020    High Dose Statin 09/05/2025 9/5/2024    Hemoglobin A1c 09/06/2025 3/6/2025    Mammogram 11/06/2025 11/6/2024    Diabetes Urine Screening 03/06/2026 3/6/2025    Lipid Panel 03/06/2026 3/6/2025    DEXA Scan 10/30/2026 10/30/2024             Past Medical History:   Diagnosis Date    Arthritis     Hiatal hernia     Hypertension     Primary hypertension 7/19/2023        Past Surgical History:   Procedure Laterality Date    COLON SURGERY      COLONOSCOPY  07/21/2020    EYE SURGERY  May2021        Social History     Socioeconomic History    Marital status:    Tobacco Use    Smoking status: Never    Smokeless tobacco: Never   Substance and Sexual Activity    Alcohol use: No    Drug use: No    Sexual activity: Yes     Partners: Male     Birth control/protection: Post-menopausal     Social Drivers of Health     Financial Resource Strain: Low Risk  (3/6/2025)    Overall Financial Resource Strain (CARDIA)     Difficulty of Paying Living Expenses: Not very hard   Food Insecurity: No Food Insecurity (3/6/2025)    Hunger Vital Sign     Worried About Running Out of Food in the Last Year: Never true     Ran Out of Food in the Last Year: Never true   Transportation Needs: No Transportation Needs (3/6/2025)    PRAPARE - Transportation     Lack of Transportation (Medical): No     Lack of Transportation (Non-Medical): No    Physical Activity: Insufficiently Active (3/6/2025)    Exercise Vital Sign     Days of Exercise per Week: 2 days     Minutes of Exercise per Session: 30 min   Stress: Stress Concern Present (3/6/2025)    [employer] [place] [employer] - Occupational Stress Questionnaire     Feeling of Stress : To some extent   Housing Stability: Low Risk  (3/6/2025)    Housing Stability Vital Sign     Unable to Pay for Housing in the Last Year: No     Number of Times Moved in the Last Year: 0     Homeless in the Last Year: No        Family History   Problem Relation Name Age of Onset    Diabetes Mother Maria Luisa     Cancer Father Annmarie         Current Outpatient Medications   Medication Instructions    carvediloL (COREG) 12.5 mg, 2 times daily    diclofenac (VOLTAREN) 50 mg, Oral, 2 times daily    empagliflozin (JARDIANCE) 25 mg, Oral, Daily    famotidine (PEPCID) 40 mg, Daily    linaCLOtide (LINZESS) 145 mcg, Daily    multivitamin capsule 1 capsule, Daily    olmesartan (BENICAR) 40 mg, Daily    rosuvastatin (CRESTOR) 10 mg, Oral, Nightly    zinc sulfate 220 mg, Daily       Review of patient's allergies indicates:   Allergen Reactions    Codeine Nausea Only     Other reaction(s): Not Indicated    Codeine phosphate (bulk)         Immunization History   Administered Date(s) Administered    COVID-19 Vaccine 04/14/2021, 05/05/2021, 11/18/2021    COVID-19, MRNA, LN-S, PF (Pfizer) (Purple Cap) 04/14/2021, 05/05/2021, 11/18/2021    COVID-19, mRNA, LNP-S, PF, ambrose-sucrose, 30 mcg/0.3 mL (Pfizer Ages 12+) 11/16/2023, 12/17/2024    COVID-19, mRNA, LNP-S, bivalent booster, PF (PFIZER OMICRON) 12/28/2022    Influenza (FLUAD) - Quadrivalent - Adjuvanted - PF *Preferred* (65+) 10/06/2022, 11/16/2023    Influenza - Quadrivalent - High Dose - PF (65 years and older) 10/06/2021    Influenza - Trivalent - Afluria, Fluzone MDV 11/01/2012, 11/01/2013, 11/01/2018, 12/01/2019, 12/01/2020    Influenza - Trivalent - Fluarix, Flulaval, Fluzone,  "Afluria - PF 10/24/2016, 11/08/2017, 11/07/2018, 12/04/2019    Influenza - Trivalent - Fluzone High Dose - PF (65 years and older) 10/22/2020, 10/02/2024, 12/17/2024    Pneumococcal Conjugate - 13 Valent 03/29/2016    Pneumococcal Polysaccharide - 23 Valent 05/23/2018    Zoster 11/23/2016        Patient Care Team:  Nate Barragan MD as PCP - General (Internal Medicine)  Torsten Robbins OD (Optometry)  Evangelina Faria LPN as Care Coordinator  Rodriguez Webster MD as Consulting Physician (Gastroenterology)  Alexus Strong MD as Consulting Physician (Obstetrics and Gynecology)  Gil, Richard Myers MD as Consulting Physician (Cardiology)  Tahir Serrano MD as Consulting Physician (General Practice)  Dustin Perez DPM as Consulting Physician (Podiatry)    Subjective:     Review of Systems    12 point review of systems conducted, negative except as stated in the history of present illness. See HPI for details.    Objective:     Visit Vitals  /74 (BP Location: Left arm, Patient Position: Sitting)   Pulse 87   Temp 97.6 °F (36.4 °C) (Temporal)   Resp 16   Ht 5' 2" (1.575 m)   Wt 92.5 kg (204 lb)   SpO2 98%   BMI 37.31 kg/m²       Physical Exam  Constitutional:       Appearance: Normal appearance. She is obese.   HENT:      Head: Normocephalic and atraumatic.   Eyes:      Extraocular Movements: Extraocular movements intact.      Pupils: Pupils are equal, round, and reactive to light.   Cardiovascular:      Rate and Rhythm: Normal rate and regular rhythm.   Pulmonary:      Effort: Pulmonary effort is normal.      Breath sounds: Normal breath sounds.   Skin:     General: Skin is warm and dry.   Neurological:      General: No focal deficit present.      Mental Status: She is alert.   Psychiatric:         Mood and Affect: Mood normal.         Behavior: Behavior normal.           Labs Reviewed:     Chemistry:  Lab Results   Component Value Date     03/06/2025    K " 3.9 03/06/2025    BUN 15.0 03/06/2025    CREATININE 0.78 03/06/2025    EGFRNORACEVR >60 03/06/2025    GLUCOSE 108 03/06/2025    CALCIUM 9.1 03/06/2025    ALKPHOS 75 03/06/2025    LABPROT 7.1 03/06/2025    ALBUMIN 4.0 03/06/2025    BILIDIR 0.2 10/04/2022    IBILI 0.60 03/14/2022    AST 23 03/06/2025    ALT 37 03/06/2025    MG 2.2 05/28/2019    WXTTIELP21ZX 30 03/06/2025    TSH 1.034 03/06/2025        Lab Results   Component Value Date    HGBA1C 6.2 03/06/2025        Hematology:  Lab Results   Component Value Date    WBC 5.93 03/06/2025    HGB 12.0 03/06/2025    HCT 35.4 (L) 03/06/2025     03/06/2025       Lipid Panel:  Lab Results   Component Value Date    CHOL 149 03/06/2025    HDL 44 03/06/2025    LDL 79.00 03/06/2025    TRIG 128 03/06/2025    TOTALCHOLEST 3 03/06/2025        Urine:  Lab Results   Component Value Date    APPEARANCEUA Clear 03/06/2025    SGUA 1.016 03/06/2025    PROTEINUA Negative 03/06/2025    KETONESUA Negative 03/06/2025    LEUKOCYTESUR Negative 03/06/2025    RBCUA 0-5 03/06/2025    WBCUA 0-5 03/06/2025    BACTERIA Trace 03/06/2025    SQEPUA Trace 03/06/2025    HYALINECASTS 1 07/13/2023    CREATRANDUR 104.6 03/06/2025        Assessment and Plan:     Assessment & Plan    E11.9 Type 2 diabetes mellitus without complication, without long-term current use of insulin  E66.01 Severe obesity (BMI 35.0-39.9) with comorbidity  E78.00 Pure hypercholesterolemia  I10 Primary hypertension  Z78.0 Postmenopausal state  Z12.31 Screening mammogram for breast cancer  A04.8 H. pylori  Z00.00 Medicare annual wellness visit, subsequent  G47.30 Sleep apnea, unspecified    IMPRESSION:   Evaluated patient's recent episodes of diarrhea and vomiting, likely related to dietary factors rather than medication side effects   Assessed A1C increase from 5.7 to 6.2, still within acceptable range but trending upward   Will switch from Mounjaro to Jardiance due to GI side effects and patient concerns   Evaluated ongoing GI  symptoms, including bloating and feeling of fullness, potentially related to H. pylori   Considered removing hydrochlorothiazide due to diuretic effect of Jardiance    E11.9 TYPE 2 DIABETES MELLITUS WITHOUT COMPLICATION, WITHOUT LONG-TERM CURRENT USE OF INSULIN:  - Evaluated patient's current glucose level of 108 and A1C increase from 5.7 to 6.2.  - Noted that sugars look good currently but expect them to increase.  - Explained Mounjaro's 21-day half-life, affecting blood sugar for several weeks after discontinuation.  - Discussed medication changes: discontinued Mounjaro 5 mg and initiated Jardiance.  - Educated patient about Jardiance's mechanism of action, causing glycosuria and associated diuresis.  - Explained effects of Mounjaro on blood sugar and cholesterol.    E66.01 SEVERE OBESITY (BMI 35.0-39.9) WITH COMORBIDITY:  - Noted patient's weight gain since September, when they weighed 195 lbs on 2.5mg Mounjaro.  - Discussed average weight loss expectations: 20% with Mounjaro vs 5-8% with Jardiance.  - Recommend switching from Mounjaro to Jardiance for weight management.  - Advised patient about potential fatigue and tiredness associated with weight gain.    I10 PRIMARY HYPERTENSION:  - Discontinued hydrochlorothiazide.    A04.8 H. PYLORI:  - Ordered stool test to check for H. pylori.    Z00.00 MEDICARE ANNUAL WELLNESS VISIT, SUBSEQUENT:  - Scheduled follow-up visit in 3 months  _ Diabetic eye exam scheduled  - Age appropriate exams UTD.      G47.30 SLEEP APNEA, UNSPECIFIED:  - Evaluated patient's reports of fatigue and tiredness, which could be symptoms of sleep apnea, potentially exacerbated by recent weight gain.  - Explained that weight loss may indirectly alleviate sleep apnea symptoms.          A comprehensive HEALTH RISK ASSESSMENT was completed today. Results are summarized below:    There are NO EMOTIONAL/SOCIAL CONCERNS identified on today's screening for Social Isolation, Depression and  Anxiety.    There are NO COGNITIVE FUNCTION CONCERNS identified on today's screening.  There are NO FUNCTIONAL OR SAFETY CONCERNS were identified on today's screening for Physical Symptoms, Nutritional, Cognitive Function, Home Safety/Living Situation, Fall Risk, Activities of Daily Living, Independent Activities of Daily Living, Physical Activity, Timed Up and Go test and Whisper test.   The patient reports NO OPIOID PRESCRIPTIONS. This was confirmed through medication reconciliation and the [facility] website.    The patient is NOT A TOBACCO USER.  The patient reports NO SIGNIFICANT ALCOHOL USE.     All Questions regarding food, transportation or housing were not answered today.    The patient was asked and declined the use of a free .    Advance Care Planning   I offered to discuss advance care planning but Rosalie Tatum is unwilling to engage in a discussion regarding Advance Directives at this time.         Provided patient with a 5-10 year written screening schedule and personal prevention plan. Recommendations were developed using the USPSTF age appropriate recommendations. Education, counseling, and referrals were provided as needed. After Visit Summary printed and given to patient, which includes a list of additional screenings\tests needed.    Follow up in about 3 months (around 6/7/2025) for DIABETIC REVISIT, NURSE PRACTITIONER, with labs prior to visit. In addition to their scheduled follow up, the patient has also been instructed to follow up on as needed basis.     Future Appointments   Date Time Provider Department Center   6/9/2025  9:20 AM Edson Britt FNP [facility] [place] [id number]        Nate Barragan MD

## 2025-03-10 ENCOUNTER — TELEPHONE (OUTPATIENT)
Dept: INTERNAL MEDICINE | Facility: CLINIC | Age: 75
End: 2025-03-10
Payer: MEDICARE

## 2025-03-10 NOTE — TELEPHONE ENCOUNTER
1st atc lvm to call back    Informed pt that the probiotic is called Love for GI and vaginal health, you can buy it on amazon

## 2025-03-10 NOTE — TELEPHONE ENCOUNTER
Pt called wanting to know if she should start a probiotic? Stated she doesn't remember if you said you were going to call her something in or if she should start a probiotic at her appt on 3/7/25. Pt stated she is out of town with her brother who is in the hospital and will get stool test done as soon as she returns. Please advise

## 2025-03-10 NOTE — TELEPHONE ENCOUNTER
----- Message from Cece sent at 3/7/2025  3:41 PM CST -----  Regarding: advice  Who Called: Rosalie Salmeron is requesting assistance/information from provider's office.Symptoms (please be specific):  How long has patient had these symptoms:  List of preferred pharmacies on file (remove unneeded): [unfilled]If different, enter pharmacy into here including location and phone number: Preferred Method of Contact: Phone CallPatient's Preferred Phone Number on File: [phone] Best Call Back Number, if different:Additional Information: Pt was seen on Friday, March 7th and was told to start taking a probiotic.  Pt wasn't sure if one would be called in for her or if it was over the counter.  Please advice pt.

## 2025-03-19 ENCOUNTER — RESULTS FOLLOW-UP (OUTPATIENT)
Dept: HEPATOLOGY | Facility: HOSPITAL | Age: 75
End: 2025-03-19
Payer: MEDICARE

## 2025-04-13 DIAGNOSIS — M25.559 ACUTE HIP PAIN, UNSPECIFIED LATERALITY: ICD-10-CM

## 2025-04-13 DIAGNOSIS — M25.571 ACUTE RIGHT ANKLE PAIN: ICD-10-CM

## 2025-04-14 RX ORDER — DICLOFENAC SODIUM 50 MG/1
50 TABLET, DELAYED RELEASE ORAL 2 TIMES DAILY
Qty: 60 TABLET | Refills: 0 | Status: SHIPPED | OUTPATIENT
Start: 2025-04-14

## 2025-04-29 ENCOUNTER — OFFICE VISIT (OUTPATIENT)
Dept: INTERNAL MEDICINE | Facility: CLINIC | Age: 75
End: 2025-04-29
Payer: MEDICARE

## 2025-04-29 ENCOUNTER — TELEPHONE (OUTPATIENT)
Dept: INTERNAL MEDICINE | Facility: CLINIC | Age: 75
End: 2025-04-29
Payer: MEDICARE

## 2025-04-29 VITALS
WEIGHT: 204.63 LBS | HEIGHT: 62 IN | SYSTOLIC BLOOD PRESSURE: 124 MMHG | HEART RATE: 83 BPM | BODY MASS INDEX: 37.66 KG/M2 | RESPIRATION RATE: 16 BRPM | DIASTOLIC BLOOD PRESSURE: 72 MMHG | OXYGEN SATURATION: 99 %

## 2025-04-29 DIAGNOSIS — M25.551 RIGHT HIP PAIN: ICD-10-CM

## 2025-04-29 DIAGNOSIS — M54.50 ACUTE RIGHT-SIDED LOW BACK PAIN WITHOUT SCIATICA: Primary | ICD-10-CM

## 2025-04-29 PROCEDURE — 99213 OFFICE O/P EST LOW 20 MIN: CPT | Mod: ,,, | Performed by: NURSE PRACTITIONER

## 2025-04-29 RX ORDER — LIDOCAINE 50 MG/G
1 PATCH TOPICAL
COMMUNITY
Start: 2025-04-22 | End: 2025-05-22

## 2025-04-29 RX ORDER — CYCLOBENZAPRINE HCL 5 MG
5 TABLET ORAL 3 TIMES DAILY PRN
Qty: 30 TABLET | Refills: 0 | Status: SHIPPED | OUTPATIENT
Start: 2025-04-29 | End: 2025-05-09

## 2025-04-29 RX ORDER — ERGOCALCIFEROL 1.25 MG/1
50000 CAPSULE ORAL
COMMUNITY
Start: 2025-03-22

## 2025-04-29 RX ORDER — TIZANIDINE 4 MG/1
4 TABLET ORAL 2 TIMES DAILY PRN
COMMUNITY
Start: 2025-04-22 | End: 2025-04-29

## 2025-04-29 NOTE — TELEPHONE ENCOUNTER
----- Message from Lesley sent at 4/29/2025  9:53 AM CDT -----  .Who Called: Rosalie Salmeron is requesting assistance/information from provider's office.Symptoms (please be specific): pain in left hip and lower back pain How long has patient had these symptoms:  NaList of preferred pharmacies on file (remove unneeded): [unfilled]If different, enter pharmacy into here including location and phone number: naPreferred Method of Contact: Phone CallPatient's Preferred Phone Number on File: [phone] Best Call Back Number, if different:Additional Information: pt went to  [facility] urgent [facility] on 4/22/25-she was prescribed (Tizanidin).She stated that it is not helping. She wants nurse to give her call

## 2025-04-29 NOTE — PROGRESS NOTES
Internal Medicine      Patient Name:  Rosalie Tatum  Patient ID:  [id number]    Chief Complaint:  Back Pain and Hip Pain      Rosalie Tatum is a 74 y.o. female, known to Dr Nelson, is here today for requested visit.  Medical comorbidities include HTN, HLD, dm, obesity.    History of Present Illness    CHIEF COMPLAINT:  Ms. Tatum presents today with left hip and lower back pain    HISTORY OF PRESENT ILLNESS:  She reports left hip and lower back pain for approximately one week, initially starting in the lower back before radiating to the hip. The pain is described as dull without any known triggering injury. Pain worsens with positional changes, particularly with sitting and transitions from sitting to standing. Standing and walking are generally tolerable. She recently visited urgent care (4/22/25) where she received steroid and toradol injections. Ice provides significant relief. She previously attempted physical therapy several months ago but discontinued due to COVID exposure at the facility.    CURRENT MEDICATIONS:  She takes Diclofenac twice daily. She was recently prescribed Tizanidine and Novocaine patches for pain management, but reports significant drowsiness and daytime somnolence with morning Tizanidine use.    MEDICAL HISTORY:  She has a history of nerve pain with burning sensation and arthritis in hands with morning finger stiffness. Past surgical history includes carpal tunnel surgery and previous back injection.        Last AWV:  03/07/2025       Past Medical History:   Diagnosis Date    Arthritis     Hiatal hernia     Hypertension     Primary hypertension 7/19/2023        Past Surgical History:   Procedure Laterality Date    COLON SURGERY      COLONOSCOPY  07/21/2020    EYE SURGERY  May2021        Social History     Tobacco Use    Smoking status: Never    Smokeless tobacco: Never   Substance and Sexual Activity    Alcohol use: No    Drug use: No    Sexual activity: Yes     Partners: Male     Birth  "control/protection: Post-menopausal        Current Outpatient Medications   Medication Instructions    carvediloL (COREG) 12.5 mg, 2 times daily    diclofenac (VOLTAREN) 50 mg, Oral, 2 times daily    empagliflozin (JARDIANCE) 25 mg, Oral, Daily    ergocalciferol (ERGOCALCIFEROL) 50,000 Units, Every 7 days    LIDOcaine (LIDODERM) 5 % 1 patch, As needed (PRN)    linaCLOtide (LINZESS) 145 mcg, Daily    multivitamin capsule 1 capsule, Daily    olmesartan (BENICAR) 40 mg, Daily    rosuvastatin (CRESTOR) 10 mg, Oral, Nightly    tiZANidine (ZANAFLEX) 4 mg, Oral, 2 times daily PRN    zinc sulfate 220 mg, Daily       Review of patient's allergies indicates:   Allergen Reactions    Codeine Nausea Only     Other reaction(s): Not Indicated    Codeine phosphate (bulk)         Patient Care Team:  Nate Barragan MD as PCP - General (Internal Medicine)  Torsten Robbins OD (Optometry)  Evangelina Faria LPN as Care Coordinator  Rodriguez Webster MD as Consulting Physician (Gastroenterology)  Alexus Strong MD as Consulting Physician (Obstetrics and Gynecology)  Gil, Richard Myers MD as Consulting Physician (Cardiology)  Tahir Serrano MD as Consulting Physician (General Practice)  Dustin Perez DPM as Consulting Physician (Podiatry)       Subjective:    Review of Systems    12 point review of systems conducted, negative except as stated in the history of present illness. See HPI for details.      Objective:    Visit Vitals  /72 (BP Location: Left arm, Patient Position: Sitting)   Pulse 83   Resp 16   Ht 5' 2" (1.575 m)   Wt 92.8 kg (204 lb 9.6 oz)   SpO2 99%   BMI 37.42 kg/m²       Physical Exam  Vitals and nursing note reviewed.   Constitutional:       General: She is not in acute distress.     Appearance: She is not ill-appearing.   HENT:      Head: Normocephalic and atraumatic.      Mouth/Throat:      Mouth: Mucous membranes are moist.      Pharynx: Oropharynx is clear. "   Eyes:      General: No scleral icterus.     Extraocular Movements: Extraocular movements intact.      Conjunctiva/sclera: Conjunctivae normal.      Pupils: Pupils are equal, round, and reactive to light.   Neck:      Vascular: No carotid bruit.   Cardiovascular:      Rate and Rhythm: Normal rate and regular rhythm.      Heart sounds: Normal heart sounds. No murmur heard.     No friction rub. No gallop.   Pulmonary:      Effort: Pulmonary effort is normal. No respiratory distress.      Breath sounds: Normal breath sounds. No wheezing, rhonchi or rales.   Abdominal:      General: Bowel sounds are normal. There is no distension.      Palpations: Abdomen is soft. There is no mass.      Tenderness: There is no abdominal tenderness. There is no right CVA tenderness or left CVA tenderness.   Musculoskeletal:         General: Normal range of motion.      Cervical back: Normal range of motion and neck supple. No tenderness or bony tenderness.      Thoracic back: No tenderness or bony tenderness.      Lumbar back: No tenderness. Negative right straight leg raise test and negative left straight leg raise test.      Right hip: Normal.      Left hip: Tenderness present.   Skin:     General: Skin is warm and dry.      Capillary Refill: Capillary refill takes less than 2 seconds.   Neurological:      General: No focal deficit present.      Mental Status: She is alert and oriented to person, place, and time.   Psychiatric:         Mood and Affect: Mood normal.         Behavior: Behavior normal.       Labs Reviewed:    Chemistry:  Lab Results   Component Value Date     03/06/2025    K 3.9 03/06/2025    BUN 15.0 03/06/2025    CREATININE 0.78 03/06/2025    EGFRNORACEVR >60 03/06/2025    CALCIUM 9.1 03/06/2025    ALKPHOS 75 03/06/2025    ALBUMIN 4.0 03/06/2025    BILIDIR 0.2 10/04/2022    IBILI 0.60 03/14/2022    AST 23 03/06/2025    ALT 37 03/06/2025    MG 2.2 05/28/2019    WGXTMPKH20GB 30 03/06/2025    TSH 1.034 03/06/2025         Lab Results   Component Value Date    HGBA1C 6.2 03/06/2025        Hematology:  Lab Results   Component Value Date    WBC 5.93 03/06/2025    HGB 12.0 03/06/2025    HCT 35.4 (L) 03/06/2025     03/06/2025       Lipid Panel:  Lab Results   Component Value Date    CHOL 149 03/06/2025    HDL 44 03/06/2025    LDL 79.00 03/06/2025    TRIG 128 03/06/2025    TOTALCHOLEST 3 03/06/2025        Urine:  Lab Results   Component Value Date    APPEARANCEUA Clear 03/06/2025    SGUA 1.016 03/06/2025    PROTEINUA Negative 03/06/2025    KETONESUA Negative 03/06/2025    LEUKOCYTESUR Negative 03/06/2025    RBCUA 0-5 03/06/2025    WBCUA 0-5 03/06/2025    BACTERIA Trace 03/06/2025    SQEPUA Trace 03/06/2025    HYALINECASTS 1 07/13/2023    CREATRANDUR 104.6 03/06/2025        Assessment:      ICD-10-CM ICD-9-CM   1. Acute right-sided low back pain without sciatica  M54.50 724.2   2. Right hip pain  M25.551 719.45        Plan:    1. Acute right-sided low back pain without sciatica  Assessment & Plan:  Received dexamethasone and toradol at recent urgent care visit.  Trial Flexeril 5mg TID prn. (Tizanidine made her drowsy)  Continue use of diclofenac BID.  Encouraged use of ice prn for comfort.  Exercises such as stretching provided at end of visit.  Discuss referral to PT - patient declines at this time.  Instructed to contact office if symptoms persist or worsen.  Consider referral to PT or pain management.    Orders:  -     cyclobenzaprine (FLEXERIL) 5 MG tablet; Take 1 tablet (5 mg total) by mouth 3 (three) times daily as needed for Muscle spasms.  Dispense: 30 tablet; Refill: 0    2. Right hip pain  Assessment & Plan:  Received dexamethasone and toradol at recent urgent care visit.  Continue use of diclofenac BID.  Encouraged use of ice prn for comfort.  Discuss referral to PT - patient declines at this time.        Follow up for Previously scheduled and PRN if need. In addition to their scheduled follow up, the patient has also  been instructed to follow up on as needed basis.       Future Appointments   Date Time Provider Department Center   6/9/2025  9:20 AM Edson Britt FNP [facility] [place] [id number]          [facility]      Disclaimer:  This note was generated with the assistance of ambient listening technology. Verbal consent was obtained by the patient and accompanying visitor(s) for the recording of patient appointment to facilitate this note. I attest to having reviewed and edited the generated note for accuracy, though some syntax or spelling errors may persist. Please contact the author of this note for any clarification.

## 2025-04-30 NOTE — ASSESSMENT & PLAN NOTE
Received dexamethasone and toradol at recent urgent care visit.  Trial Flexeril 5mg TID prn. (Tizanidine made her drowsy)  Continue use of diclofenac BID.  Encouraged use of ice prn for comfort.  Exercises such as stretching provided at end of visit.  Discuss referral to PT - patient declines at this time.  Instructed to contact office if symptoms persist or worsen.  Consider referral to PT or pain management.

## 2025-04-30 NOTE — ASSESSMENT & PLAN NOTE
Received dexamethasone and toradol at recent urgent care visit.  Continue use of diclofenac BID.  Encouraged use of ice prn for comfort.  Discuss referral to PT - patient declines at this time.

## 2025-05-18 DIAGNOSIS — M25.559 ACUTE HIP PAIN, UNSPECIFIED LATERALITY: ICD-10-CM

## 2025-05-18 DIAGNOSIS — M25.571 ACUTE RIGHT ANKLE PAIN: ICD-10-CM

## 2025-05-19 RX ORDER — DICLOFENAC SODIUM 50 MG/1
50 TABLET, DELAYED RELEASE ORAL 2 TIMES DAILY
Qty: 60 TABLET | Refills: 0 | Status: SHIPPED | OUTPATIENT
Start: 2025-05-19

## 2025-05-20 LAB
LEFT EYE DM RETINOPATHY: NEGATIVE
RIGHT EYE DM RETINOPATHY: NEGATIVE

## 2025-05-22 ENCOUNTER — PATIENT OUTREACH (OUTPATIENT)
Dept: ADMINISTRATIVE | Facility: HOSPITAL | Age: 75
End: 2025-05-22
Payer: MEDICARE

## 2025-05-30 DIAGNOSIS — E11.9 TYPE 2 DIABETES MELLITUS WITHOUT COMPLICATION, WITHOUT LONG-TERM CURRENT USE OF INSULIN: Primary | ICD-10-CM

## 2025-06-02 ENCOUNTER — TELEPHONE (OUTPATIENT)
Dept: INTERNAL MEDICINE | Facility: CLINIC | Age: 75
End: 2025-06-02
Payer: MEDICARE

## 2025-06-04 ENCOUNTER — LAB VISIT (OUTPATIENT)
Dept: LAB | Facility: HOSPITAL | Age: 75
End: 2025-06-04
Payer: MEDICARE

## 2025-06-04 ENCOUNTER — RESULTS FOLLOW-UP (OUTPATIENT)
Dept: INTERNAL MEDICINE | Facility: CLINIC | Age: 75
End: 2025-06-04

## 2025-06-04 DIAGNOSIS — E11.9 TYPE 2 DIABETES MELLITUS WITHOUT COMPLICATION, WITHOUT LONG-TERM CURRENT USE OF INSULIN: ICD-10-CM

## 2025-06-04 LAB
EST. AVERAGE GLUCOSE BLD GHB EST-MCNC: 148.5 MG/DL
HBA1C MFR BLD: 6.8 %

## 2025-06-04 PROCEDURE — 36415 COLL VENOUS BLD VENIPUNCTURE: CPT

## 2025-06-04 PROCEDURE — 83036 HEMOGLOBIN GLYCOSYLATED A1C: CPT

## 2025-06-09 ENCOUNTER — OFFICE VISIT (OUTPATIENT)
Dept: INTERNAL MEDICINE | Facility: CLINIC | Age: 75
End: 2025-06-09
Payer: MEDICARE

## 2025-06-09 VITALS
HEIGHT: 62 IN | HEART RATE: 90 BPM | DIASTOLIC BLOOD PRESSURE: 76 MMHG | WEIGHT: 205 LBS | SYSTOLIC BLOOD PRESSURE: 128 MMHG | BODY MASS INDEX: 37.73 KG/M2

## 2025-06-09 DIAGNOSIS — G47.00 TROUBLE STAYING ASLEEP: ICD-10-CM

## 2025-06-09 DIAGNOSIS — R61 NIGHT SWEATS: ICD-10-CM

## 2025-06-09 DIAGNOSIS — E55.9 VITAMIN D DEFICIENCY: Chronic | ICD-10-CM

## 2025-06-09 DIAGNOSIS — M54.42 CHRONIC BILATERAL LOW BACK PAIN WITH LEFT-SIDED SCIATICA: ICD-10-CM

## 2025-06-09 DIAGNOSIS — E11.65 TYPE 2 DIABETES MELLITUS WITH HYPERGLYCEMIA, WITHOUT LONG-TERM CURRENT USE OF INSULIN: Primary | ICD-10-CM

## 2025-06-09 DIAGNOSIS — E78.2 MIXED HYPERLIPIDEMIA: Chronic | ICD-10-CM

## 2025-06-09 DIAGNOSIS — G47.33 OBSTRUCTIVE SLEEP APNEA SYNDROME: Chronic | ICD-10-CM

## 2025-06-09 DIAGNOSIS — G89.29 CHRONIC BILATERAL LOW BACK PAIN WITH LEFT-SIDED SCIATICA: ICD-10-CM

## 2025-06-09 DIAGNOSIS — I10 PRIMARY HYPERTENSION: Chronic | ICD-10-CM

## 2025-06-09 PROBLEM — G47.30 SLEEP APNEA: Chronic | Status: ACTIVE | Noted: 2022-10-06

## 2025-06-09 PROCEDURE — 99214 OFFICE O/P EST MOD 30 MIN: CPT | Mod: ,,,

## 2025-06-09 PROCEDURE — G2211 COMPLEX E/M VISIT ADD ON: HCPCS | Mod: ,,,

## 2025-06-09 RX ORDER — AMITRIPTYLINE HYDROCHLORIDE 10 MG/1
10 TABLET, FILM COATED ORAL NIGHTLY
Qty: 30 TABLET | Refills: 5 | Status: SHIPPED | OUTPATIENT
Start: 2025-06-09 | End: 2025-12-06

## 2025-06-09 RX ORDER — EMPAGLIFLOZIN, METFORMIN HYDROCHLORIDE 5; 1000 MG/1; MG/1
1 TABLET, EXTENDED RELEASE ORAL NIGHTLY
Qty: 30 TABLET | Refills: 2 | Status: SHIPPED | OUTPATIENT
Start: 2025-06-09 | End: 2025-12-06

## 2025-06-09 NOTE — ASSESSMENT & PLAN NOTE
-initiate trial on amitriptyline nightly 10 mg may also help with current complaints of sleep trouble/chronic back pain

## 2025-06-09 NOTE — PROGRESS NOTES
Patient ID: Rsoalie Tatum is a 74 y.o. female.    Chief Complaint: Follow-up (3 month follow up - DM. Patient has been feeling fatigue more than usual lately. )    Rosalie Tatum is a 74 y.o. female, known to Dr Nelson, presents today for a diabetic follow-up visit.  Medical comorbidities include  HTN, HLD, dm, obesity.       History of Present Illness    Patient presents today with fatigue and tiredness She uses a CPAP machine and maintains a regular sleep schedule, going to bed at 11 PM and waking up at 7 AM. She experiences intermittent sleep disturbances, waking up at 3-4 AM and staying awake for about an hour before falling back asleep. She attributes this to her body's natural rhythm rather than anxiety or racing thoughts. She experiences night sweats with associated clammy and hot sensations post-menopause, though not severe enough to require changing clothes.  Also with history of chronic lower back pain radiating to the left hip. She previously received a back injection several years ago for symptom management.  Currently on weekly vitamin D supplementation for vitamin-D deficiency.  HGB A1c 6.8, worsened from prior.  Jardiance reported to be financially unobtainable, however unsure of associated to lack of insurance coverage or current doughnut hole.  Previously tried both Mounjaro and Ozempic, both stopped for adverse GI effects with nausea, vomiting.  Otherwise stable for today's visit       Wellness:03/07/2025     MEDICAL HISTORY:    Past Medical History:   Diagnosis Date    Arthritis     Hiatal hernia     Hypertension     Primary hypertension 7/19/2023      Past Surgical History:   Procedure Laterality Date    COLON SURGERY      COLONOSCOPY  07/21/2020    EYE SURGERY  May2021      Social History[1]       Health Maintenance Due   Topic Date Due    TETANUS VACCINE  Never done    RSV Vaccine (Age 60+ and Pregnant patients) (1 - Risk 60-74 years 1-dose series) Never done    Shingles Vaccine (2 of 3)  "01/18/2017    Foot Exam  08/17/2023    Colorectal Cancer Screening  07/21/2025          Patient Care Team:  Nate Barragan MD as PCP - General (Internal Medicine)  Torsten Robbins OD (Optometry)  Evangelina Faria LPN as Care Coordinator  Rodriguez Webster MD as Consulting Physician (Gastroenterology)  Alexus Strong MD as Consulting Physician (Obstetrics and Gynecology)  Gil, Richard Myers MD as Consulting Physician (Cardiology)  Tahir Serrano MD as Consulting Physician (General Practice)  Dustin Perez DPM as Consulting Physician (Podiatry)      Review of Systems   Constitutional:  Negative for fatigue and fever.   HENT:  Negative for congestion, rhinorrhea, sore throat and trouble swallowing.    Eyes:  Negative for redness and visual disturbance.   Respiratory:  Negative for cough, chest tightness and shortness of breath.    Cardiovascular:  Negative for chest pain and palpitations.   Gastrointestinal:  Negative for abdominal pain, constipation, diarrhea, nausea and vomiting.   Genitourinary:  Negative for dysuria, flank pain, frequency and urgency.   Musculoskeletal:  Positive for arthralgias and back pain. Negative for gait problem and myalgias.   Skin:  Negative for rash and wound.   Neurological:  Negative for facial asymmetry, speech difficulty, weakness and headaches.   Psychiatric/Behavioral:  Positive for sleep disturbance.    All other systems reviewed and are negative.      Objective:   /76 (BP Location: Left arm, Patient Position: Sitting)   Pulse 90   Ht 5' 2" (1.575 m)   Wt 93 kg (205 lb)   SpO2 (P) 98%   BMI 37.49 kg/m²      Physical Exam  Constitutional:       General: She is not in acute distress.     Appearance: Normal appearance.   HENT:      Right Ear: Tympanic membrane, ear canal and external ear normal.      Left Ear: Tympanic membrane, ear canal and external ear normal.      Nose: Nose normal.      Mouth/Throat:      Mouth: " Mucous membranes are moist.      Pharynx: Oropharynx is clear.   Eyes:      Extraocular Movements: Extraocular movements intact.      Conjunctiva/sclera: Conjunctivae normal.      Pupils: Pupils are equal, round, and reactive to light.   Cardiovascular:      Rate and Rhythm: Normal rate and regular rhythm.      Pulses: Normal pulses.      Heart sounds: Normal heart sounds. No murmur heard.     No gallop.   Pulmonary:      Effort: Pulmonary effort is normal.      Breath sounds: Normal breath sounds. No wheezing.   Abdominal:      General: Bowel sounds are normal. There is no distension.      Palpations: Abdomen is soft. There is no mass.      Tenderness: There is no abdominal tenderness. There is no guarding.   Musculoskeletal:         General: Normal range of motion.   Skin:     General: Skin is warm and dry.   Neurological:      Mental Status: She is alert. Mental status is at baseline.      Sensory: No sensory deficit.      Motor: No weakness.           Assessment:       ICD-10-CM ICD-9-CM   1. Type 2 diabetes mellitus with hyperglycemia, without long-term current use of insulin  E11.65 250.00     790.29   2. Trouble staying asleep  G47.00 780.52   3. Chronic bilateral low back pain with left-sided sciatica  G89.29 338.29    M54.42 724.2     724.3   4. Night sweats  R61 780.8   5. Obstructive sleep apnea syndrome  G47.33 327.23   6. Vitamin D deficiency  E55.9 268.9   7. Primary hypertension  I10 401.9   8. Mixed hyperlipidemia  E78.2 272.2        Plan:   1. Type 2 diabetes mellitus with hyperglycemia, without long-term current use of insulin  Assessment & Plan:  Lab Results   Component Value Date    HGBA1C 6.8 06/04/2025   -worsened from previous  -reports unable to tolerate Ozempic or Mounjaro secondary to GI adverse  -reports Jardiance 25 mg financially unobtainable, patient is unsure if secondary to doughnut hole or prior authorization  -initiate trial on Synjardy XR 5-1000 mg nightly, we will attempt to have  PA reviewed by our staff  -low-carbohydrate diet   -encouraged to obtain diabetic eye exam yearly   -encouraged to obtain diabetic foot exam yearly      Orders:  -     empagliflozin-metformin (SYNJARDY XR) 5-1,000 mg TBph; Take 1 tablet by mouth nightly.  Dispense: 30 tablet; Refill: 2    2. Trouble staying asleep  Assessment & Plan:  -initiate trial on amitriptyline nightly 10 mg may also help with current complaints of chronic back pain/night sweats    Orders:  -     amitriptyline (ELAVIL) 10 MG tablet; Take 1 tablet (10 mg total) by mouth every evening.  Dispense: 30 tablet; Refill: 5    3. Chronic bilateral low back pain with left-sided sciatica  Assessment & Plan:  -stable /current  -currently on diclofenac 50 mg b.i.d.   -reports unable to tolerate gabapentin  -initiate trial on amitriptyline nightly 10 mg may also help with current complaints of sleep trouble/night sweats    Orders:  -     amitriptyline (ELAVIL) 10 MG tablet; Take 1 tablet (10 mg total) by mouth every evening.  Dispense: 30 tablet; Refill: 5    4. Night sweats  Assessment & Plan:  -initiate trial on amitriptyline nightly 10 mg may also help with current complaints of sleep trouble/chronic back pain    Orders:  -     amitriptyline (ELAVIL) 10 MG tablet; Take 1 tablet (10 mg total) by mouth every evening.  Dispense: 30 tablet; Refill: 5    5. Obstructive sleep apnea syndrome  Assessment & Plan:  -reports compliance on PAP device , continue  -      6. Vitamin D deficiency  Assessment & Plan:  -currently on vitamin-D 50,000 once weekly, continue   -order updated vitamin-D level for next      7. Primary hypertension  Assessment & Plan:  -stable   -currently on Coreg 12.5 mg b.i.d. and olmesartan 40 mg daily, continue   -low-sodium diet      8. Mixed hyperlipidemia  Assessment & Plan:  -currently on rosuvastatin 10 mg nightly, continue   -LDL goal less than 100 (diabetes mellitus comorbidity)            Follow up in about 3 months (around 9/9/2025)  for Medication Evaluation, Diabetes, with labs prior.   -plan specifics discussed above    Orders Placed This Encounter    empagliflozin-metformin (SYNJARDY XR) 5-1,000 mg TBph    amitriptyline (ELAVIL) 10 MG tablet        Medication List with Changes/Refills   New Medications    AMITRIPTYLINE (ELAVIL) 10 MG TABLET    Take 1 tablet (10 mg total) by mouth every evening.    EMPAGLIFLOZIN-METFORMIN (SYNJARDY XR) 5-1,000 MG TBPH    Take 1 tablet by mouth nightly.   Current Medications    CARVEDILOL (COREG) 12.5 MG TABLET    Take 12.5 mg by mouth 2 (two) times daily.    DICLOFENAC (VOLTAREN) 50 MG EC TABLET    TAKE 1 TABLET BY MOUTH TWICE A DAY    ERGOCALCIFEROL (ERGOCALCIFEROL) 50,000 UNIT CAP    Take 50,000 Units by mouth every 7 days.    LINACLOTIDE (LINZESS) 145 MCG CAP CAPSULE    Take 145 mcg by mouth once daily.    MULTIVITAMIN CAPSULE    Take 1 capsule by mouth once daily.    OLMESARTAN (BENICAR) 40 MG TABLET    Take 40 mg by mouth once daily.    ROSUVASTATIN (CRESTOR) 10 MG TABLET    TAKE 1 TABLET BY MOUTH EVERY DAY IN THE EVENING    ZINC SULFATE 50 MG ZINC (220 MG) TAB TABLET    Take 220 mg by mouth once daily.   Discontinued Medications    EMPAGLIFLOZIN (JARDIANCE) 25 MG TABLET    Take 1 tablet (25 mg total) by mouth once daily.      This note was generated with the assistance of ambient listening technology. I attest to having reviewed and edited the generated note for accuracy, though some syntax or spelling errors may persist. Please contact the author of this note for any clarification.        [1]   Social History  Tobacco Use    Smoking status: Never    Smokeless tobacco: Never   Substance Use Topics    Alcohol use: No    Drug use: No

## 2025-06-09 NOTE — ASSESSMENT & PLAN NOTE
-stable   -currently on Coreg 12.5 mg b.i.d. and olmesartan 40 mg daily, continue   -low-sodium diet

## 2025-06-09 NOTE — ASSESSMENT & PLAN NOTE
-stable /current  -currently on diclofenac 50 mg b.i.d.   -reports unable to tolerate gabapentin  -initiate trial on amitriptyline nightly 10 mg may also help with current complaints of sleep trouble/night sweats

## 2025-06-09 NOTE — ASSESSMENT & PLAN NOTE
-currently on rosuvastatin 10 mg nightly, continue   -LDL goal less than 100 (diabetes mellitus comorbidity)

## 2025-06-09 NOTE — ASSESSMENT & PLAN NOTE
Lab Results   Component Value Date    HGBA1C 6.8 06/04/2025   -worsened from previous  -reports unable to tolerate Ozempic or Mounjaro secondary to GI adverse  -reports Jardiance 25 mg financially unobtainable, patient is unsure if secondary to marianna pantoja or prior authorization  -initiate trial on Synjardy XR 5-1000 mg nightly, we will attempt to have PA reviewed by our staff  -low-carbohydrate diet   -encouraged to obtain diabetic eye exam yearly   -encouraged to obtain diabetic foot exam yearly

## 2025-06-09 NOTE — ASSESSMENT & PLAN NOTE
-initiate trial on amitriptyline nightly 10 mg may also help with current complaints of chronic back pain/night sweats

## 2025-06-15 DIAGNOSIS — M25.571 ACUTE RIGHT ANKLE PAIN: ICD-10-CM

## 2025-06-15 DIAGNOSIS — M25.559 ACUTE HIP PAIN, UNSPECIFIED LATERALITY: ICD-10-CM

## 2025-06-16 RX ORDER — DICLOFENAC SODIUM 50 MG/1
50 TABLET, DELAYED RELEASE ORAL 2 TIMES DAILY
Qty: 60 TABLET | Refills: 0 | Status: SHIPPED | OUTPATIENT
Start: 2025-06-16

## 2025-07-14 DIAGNOSIS — M25.571 ACUTE RIGHT ANKLE PAIN: ICD-10-CM

## 2025-07-14 DIAGNOSIS — M25.559 ACUTE HIP PAIN, UNSPECIFIED LATERALITY: ICD-10-CM

## 2025-07-14 RX ORDER — DICLOFENAC SODIUM 50 MG/1
50 TABLET, DELAYED RELEASE ORAL 2 TIMES DAILY
Qty: 60 TABLET | Refills: 0 | Status: SHIPPED | OUTPATIENT
Start: 2025-07-14

## 2025-08-10 DIAGNOSIS — M25.571 ACUTE RIGHT ANKLE PAIN: ICD-10-CM

## 2025-08-10 DIAGNOSIS — M25.559 ACUTE HIP PAIN, UNSPECIFIED LATERALITY: ICD-10-CM

## 2025-08-11 RX ORDER — DICLOFENAC SODIUM 50 MG/1
50 TABLET, DELAYED RELEASE ORAL 2 TIMES DAILY
Qty: 60 TABLET | Refills: 0 | Status: SHIPPED | OUTPATIENT
Start: 2025-08-11

## 2025-08-26 ENCOUNTER — TELEPHONE (OUTPATIENT)
Dept: INTERNAL MEDICINE | Facility: CLINIC | Age: 75
End: 2025-08-26
Payer: MEDICARE

## 2025-08-26 DIAGNOSIS — E11.65 TYPE 2 DIABETES MELLITUS WITH HYPERGLYCEMIA, WITHOUT LONG-TERM CURRENT USE OF INSULIN: Primary | ICD-10-CM

## 2025-09-03 DIAGNOSIS — E11.65 TYPE 2 DIABETES MELLITUS WITH HYPERGLYCEMIA, WITHOUT LONG-TERM CURRENT USE OF INSULIN: ICD-10-CM

## 2025-09-03 RX ORDER — EMPAGLIFLOZIN, METFORMIN HYDROCHLORIDE 5; 1000 MG/1; MG/1
1 TABLET, EXTENDED RELEASE ORAL NIGHTLY
Qty: 30 TABLET | Refills: 2 | Status: SHIPPED | OUTPATIENT
Start: 2025-09-03